# Patient Record
Sex: MALE
[De-identification: names, ages, dates, MRNs, and addresses within clinical notes are randomized per-mention and may not be internally consistent; named-entity substitution may affect disease eponyms.]

---

## 2017-05-08 ENCOUNTER — APPOINTMENT (OUTPATIENT)
Dept: ORTHOPEDIC SURGERY | Facility: CLINIC | Age: 67
End: 2017-05-08

## 2017-05-08 VITALS
WEIGHT: 210 LBS | HEIGHT: 68 IN | HEART RATE: 72 BPM | SYSTOLIC BLOOD PRESSURE: 109 MMHG | BODY MASS INDEX: 31.83 KG/M2 | DIASTOLIC BLOOD PRESSURE: 80 MMHG

## 2017-05-08 DIAGNOSIS — M22.42 CHONDROMALACIA PATELLAE, LEFT KNEE: ICD-10-CM

## 2017-05-08 DIAGNOSIS — M22.41 CHONDROMALACIA PATELLAE, RIGHT KNEE: ICD-10-CM

## 2017-05-08 DIAGNOSIS — S83.001A UNSPECIFIED SUBLUXATION OF RIGHT PATELLA, INITIAL ENCOUNTER: ICD-10-CM

## 2017-05-08 RX ORDER — FOLIC ACID 1 MG/1
1 TABLET ORAL
Refills: 0 | Status: ACTIVE | COMMUNITY

## 2017-05-10 ENCOUNTER — APPOINTMENT (OUTPATIENT)
Dept: SURGERY | Facility: CLINIC | Age: 67
End: 2017-05-10

## 2017-05-10 ENCOUNTER — OUTPATIENT (OUTPATIENT)
Dept: OUTPATIENT SERVICES | Facility: HOSPITAL | Age: 67
LOS: 1 days | End: 2017-05-10
Payer: COMMERCIAL

## 2017-05-10 VITALS
HEIGHT: 68 IN | DIASTOLIC BLOOD PRESSURE: 83 MMHG | HEART RATE: 66 BPM | RESPIRATION RATE: 16 BRPM | SYSTOLIC BLOOD PRESSURE: 121 MMHG | TEMPERATURE: 98 F | OXYGEN SATURATION: 97 % | WEIGHT: 213.85 LBS

## 2017-05-10 DIAGNOSIS — Z98.89 OTHER SPECIFIED POSTPROCEDURAL STATES: Chronic | ICD-10-CM

## 2017-05-10 DIAGNOSIS — S83.231A COMPLEX TEAR OF MEDIAL MENISCUS, CURRENT INJURY, RIGHT KNEE, INITIAL ENCOUNTER: ICD-10-CM

## 2017-05-10 DIAGNOSIS — Z90.49 ACQUIRED ABSENCE OF OTHER SPECIFIED PARTS OF DIGESTIVE TRACT: Chronic | ICD-10-CM

## 2017-05-10 DIAGNOSIS — Z01.818 ENCOUNTER FOR OTHER PREPROCEDURAL EXAMINATION: ICD-10-CM

## 2017-05-10 DIAGNOSIS — Z85.46 PERSONAL HISTORY OF MALIGNANT NEOPLASM OF PROSTATE: Chronic | ICD-10-CM

## 2017-05-10 LAB
ALBUMIN SERPL ELPH-MCNC: 4 G/DL — SIGNIFICANT CHANGE UP (ref 3.4–5)
ALP SERPL-CCNC: 67 U/L — SIGNIFICANT CHANGE UP (ref 40–120)
ALT FLD-CCNC: 43 U/L — HIGH (ref 12–42)
ANION GAP SERPL CALC-SCNC: 7 MMOL/L — LOW (ref 9–16)
APTT BLD: 36.5 SEC — SIGNIFICANT CHANGE UP (ref 27.5–37.4)
AST SERPL-CCNC: 26 U/L — SIGNIFICANT CHANGE UP (ref 15–37)
BILIRUB SERPL-MCNC: 1.1 MG/DL — SIGNIFICANT CHANGE UP (ref 0.2–1.2)
BUN SERPL-MCNC: 13 MG/DL — SIGNIFICANT CHANGE UP (ref 7–23)
CALCIUM SERPL-MCNC: 9.1 MG/DL — SIGNIFICANT CHANGE UP (ref 8.5–10.5)
CHLORIDE SERPL-SCNC: 104 MMOL/L — SIGNIFICANT CHANGE UP (ref 96–108)
CO2 SERPL-SCNC: 27 MMOL/L — SIGNIFICANT CHANGE UP (ref 22–31)
CREAT SERPL-MCNC: 0.89 MG/DL — SIGNIFICANT CHANGE UP (ref 0.5–1.3)
GLUCOSE SERPL-MCNC: 96 MG/DL — SIGNIFICANT CHANGE UP (ref 70–99)
HCT VFR BLD CALC: 44.8 % — SIGNIFICANT CHANGE UP (ref 39–50)
HGB BLD-MCNC: 15.3 G/DL — SIGNIFICANT CHANGE UP (ref 13–17)
INR BLD: 1 — SIGNIFICANT CHANGE UP (ref 0.88–1.16)
MCHC RBC-ENTMCNC: 32 PG — SIGNIFICANT CHANGE UP (ref 27–34)
MCHC RBC-ENTMCNC: 34.2 G/DL — SIGNIFICANT CHANGE UP (ref 32–36)
MCV RBC AUTO: 93.7 FL — SIGNIFICANT CHANGE UP (ref 80–100)
PLATELET # BLD AUTO: 217 K/UL — SIGNIFICANT CHANGE UP (ref 150–400)
POTASSIUM SERPL-MCNC: 4 MMOL/L — SIGNIFICANT CHANGE UP (ref 3.5–5.3)
POTASSIUM SERPL-SCNC: 4 MMOL/L — SIGNIFICANT CHANGE UP (ref 3.5–5.3)
PROT SERPL-MCNC: 7.6 G/DL — SIGNIFICANT CHANGE UP (ref 6.4–8.2)
PROTHROM AB SERPL-ACNC: 11.1 SEC — SIGNIFICANT CHANGE UP (ref 9.8–12.7)
RBC # BLD: 4.78 M/UL — SIGNIFICANT CHANGE UP (ref 4.2–5.8)
RBC # FLD: 13.5 % — SIGNIFICANT CHANGE UP (ref 10.3–16.9)
SODIUM SERPL-SCNC: 138 MMOL/L — SIGNIFICANT CHANGE UP (ref 135–145)
WBC # BLD: 5.8 K/UL — SIGNIFICANT CHANGE UP (ref 3.8–10.5)
WBC # FLD AUTO: 5.8 K/UL — SIGNIFICANT CHANGE UP (ref 3.8–10.5)

## 2017-05-10 PROCEDURE — 71020: CPT | Mod: 26

## 2017-05-10 PROCEDURE — 93010 ELECTROCARDIOGRAM REPORT: CPT

## 2017-05-10 NOTE — H&P PST ADULT - HISTORY OF PRESENT ILLNESS
Pt reports slipping and falling down subway stairs one month ago. He reports swelling and reduced ROM in right knee.

## 2017-05-10 NOTE — ASU PATIENT PROFILE, ADULT - PSH
H/O prostate cancer  2012 - seed implant  H/O vitrectomy  right 2015 for retinal detachment  History of appendectomy    S/P LASIK surgery  1999

## 2017-05-10 NOTE — H&P PST ADULT - FAMILY HISTORY
Father  Still living? No  Family history of pancreatic cancer, Age at diagnosis: 61-70     Mother  Still living? No  Family history of stomach cancer, Age at diagnosis: 41-50

## 2017-05-10 NOTE — H&P PST ADULT - NSANTHOSAYNRD_GEN_A_CORE
No. NURA screening performed.  STOP BANG Legend: 0-2 = LOW Risk; 3-4 = INTERMEDIATE Risk; 5-8 = HIGH Risk

## 2017-05-16 ENCOUNTER — RX RENEWAL (OUTPATIENT)
Age: 67
End: 2017-05-16

## 2017-05-23 ENCOUNTER — RESULT REVIEW (OUTPATIENT)
Age: 67
End: 2017-05-23

## 2017-05-23 ENCOUNTER — OUTPATIENT (OUTPATIENT)
Dept: OUTPATIENT SERVICES | Facility: HOSPITAL | Age: 67
LOS: 1 days | Discharge: HOME | End: 2017-05-23
Payer: COMMERCIAL

## 2017-05-23 ENCOUNTER — APPOINTMENT (OUTPATIENT)
Dept: ORTHOPEDIC SURGERY | Facility: HOSPITAL | Age: 67
End: 2017-05-23

## 2017-05-23 VITALS
HEART RATE: 61 BPM | HEIGHT: 68 IN | SYSTOLIC BLOOD PRESSURE: 126 MMHG | OXYGEN SATURATION: 96 % | WEIGHT: 213.19 LBS | DIASTOLIC BLOOD PRESSURE: 83 MMHG | TEMPERATURE: 98 F | RESPIRATION RATE: 16 BRPM

## 2017-05-23 VITALS
OXYGEN SATURATION: 99 % | HEART RATE: 70 BPM | TEMPERATURE: 97 F | SYSTOLIC BLOOD PRESSURE: 110 MMHG | RESPIRATION RATE: 13 BRPM | DIASTOLIC BLOOD PRESSURE: 65 MMHG

## 2017-05-23 DIAGNOSIS — Z98.89 OTHER SPECIFIED POSTPROCEDURAL STATES: Chronic | ICD-10-CM

## 2017-05-23 DIAGNOSIS — Z85.46 PERSONAL HISTORY OF MALIGNANT NEOPLASM OF PROSTATE: Chronic | ICD-10-CM

## 2017-05-23 DIAGNOSIS — Z90.49 ACQUIRED ABSENCE OF OTHER SPECIFIED PARTS OF DIGESTIVE TRACT: Chronic | ICD-10-CM

## 2017-05-23 PROCEDURE — 29881 ARTHRS KNE SRG MNISECTMY M/L: CPT | Mod: RT

## 2017-05-23 NOTE — ASU DISCHARGE PLAN (ADULT/PEDIATRIC). - NOTIFY
Bleeding that does not stop/Swelling that continues/Numbness, color, or temperature change to extremity/Persistent Nausea and Vomiting/Unable to Urinate/Pain not relieved by Medications/Fever greater than 101/Numbness, tingling/Excessive Diarrhea/Inability to Tolerate Liquids or Foods/Increased Irritability or Sluggishness

## 2017-05-23 NOTE — ASU DISCHARGE PLAN (ADULT/PEDIATRIC). - MEDICATION SUMMARY - MEDICATIONS TO TAKE
I will START or STAY ON the medications listed below when I get home from the hospital:    Rapaflo 8 mg oral capsule  -- 1 cap(s) by mouth once a day in pm  -- Indication: For bph    Livalo 2 mg oral tablet  -- 1 tab(s) by mouth once a day  -- Indication: For cholesterol    folic acid 1 mg oral tablet  -- 1 tab(s) by mouth once a day  -- Indication: For vitamins

## 2017-05-27 DIAGNOSIS — M17.11 UNILATERAL PRIMARY OSTEOARTHRITIS, RIGHT KNEE: ICD-10-CM

## 2017-05-27 DIAGNOSIS — I49.9 CARDIAC ARRHYTHMIA, UNSPECIFIED: ICD-10-CM
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
## 2017-05-27 DIAGNOSIS — E78.5 HYPERLIPIDEMIA, UNSPECIFIED: ICD-10-CM
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
## 2017-05-27 DIAGNOSIS — S83.251A BUCKET-HANDLE TEAR OF LATERAL MENISCUS, CURRENT INJURY, RIGHT KNEE, INITIAL ENCOUNTER: ICD-10-CM

## 2017-05-27 DIAGNOSIS — Z85.46 PERSONAL HISTORY OF MALIGNANT NEOPLASM OF PROSTATE: ICD-10-CM

## 2017-05-30 LAB — SURGICAL PATHOLOGY STUDY: SIGNIFICANT CHANGE UP

## 2017-05-31 ENCOUNTER — APPOINTMENT (OUTPATIENT)
Dept: ORTHOPEDIC SURGERY | Facility: CLINIC | Age: 67
End: 2017-05-31

## 2017-05-31 DIAGNOSIS — Z80.0 FAMILY HISTORY OF MALIGNANT NEOPLASM OF DIGESTIVE ORGANS: ICD-10-CM

## 2017-05-31 DIAGNOSIS — Z09 ENCOUNTER FOR FOLLOW-UP EXAMINATION AFTER COMPLETED TREATMENT FOR CONDITIONS OTHER THAN MALIGNANT NEOPLASM: ICD-10-CM

## 2017-05-31 DIAGNOSIS — Z85.9 PERSONAL HISTORY OF MALIGNANT NEOPLASM, UNSPECIFIED: ICD-10-CM

## 2017-05-31 DIAGNOSIS — Z80.9 FAMILY HISTORY OF MALIGNANT NEOPLASM, UNSPECIFIED: ICD-10-CM

## 2017-05-31 DIAGNOSIS — E78.00 PURE HYPERCHOLESTEROLEMIA, UNSPECIFIED: ICD-10-CM

## 2017-05-31 RX ORDER — DICLOFENAC SODIUM 75 MG/1
75 TABLET, DELAYED RELEASE ORAL
Qty: 60 | Refills: 1 | Status: ACTIVE | COMMUNITY
Start: 2017-05-31 | End: 1900-01-01

## 2017-06-07 ENCOUNTER — APPOINTMENT (OUTPATIENT)
Dept: ORTHOPEDIC SURGERY | Facility: CLINIC | Age: 67
End: 2017-06-07

## 2017-06-07 RX ORDER — CELECOXIB 200 MG/1
200 CAPSULE ORAL
Qty: 2 | Refills: 0 | Status: DISCONTINUED | COMMUNITY
Start: 2017-05-08 | End: 2017-06-07

## 2017-06-07 RX ORDER — TRAMADOL HYDROCHLORIDE 50 MG/1
50 TABLET, COATED ORAL
Qty: 40 | Refills: 0 | Status: ACTIVE | COMMUNITY
Start: 2017-06-07 | End: 1900-01-01

## 2017-06-07 RX ORDER — OXYCODONE AND ACETAMINOPHEN 5; 325 MG/1; MG/1
5-325 TABLET ORAL
Qty: 30 | Refills: 0 | Status: DISCONTINUED | COMMUNITY
Start: 2017-05-16 | End: 2017-06-07

## 2017-07-14 ENCOUNTER — APPOINTMENT (OUTPATIENT)
Dept: ORTHOPEDIC SURGERY | Facility: CLINIC | Age: 67
End: 2017-07-14

## 2017-07-14 DIAGNOSIS — Z98.890 OTHER SPECIFIED POSTPROCEDURAL STATES: ICD-10-CM

## 2017-07-14 DIAGNOSIS — S83.281A OTHER TEAR OF LATERAL MENISCUS, CURRENT INJURY, RIGHT KNEE, INITIAL ENCOUNTER: ICD-10-CM

## 2017-07-26 ENCOUNTER — APPOINTMENT (OUTPATIENT)
Dept: ORTHOPEDIC SURGERY | Facility: CLINIC | Age: 67
End: 2017-07-26
Payer: COMMERCIAL

## 2017-07-26 DIAGNOSIS — M25.461 EFFUSION, RIGHT KNEE: ICD-10-CM

## 2017-07-26 PROCEDURE — 20610 DRAIN/INJ JOINT/BURSA W/O US: CPT | Mod: 58,RT

## 2017-07-26 PROCEDURE — 99214 OFFICE O/P EST MOD 30 MIN: CPT | Mod: 25

## 2017-07-26 RX ORDER — AMOXICILLIN 500 MG/1
500 TABLET, FILM COATED ORAL
Qty: 20 | Refills: 0 | Status: ACTIVE | COMMUNITY
Start: 2017-05-15

## 2017-07-26 RX ORDER — SILODOSIN 4 MG/1
4 CAPSULE ORAL
Qty: 90 | Refills: 0 | Status: ACTIVE | COMMUNITY
Start: 2017-03-20

## 2017-09-06 RX ORDER — DICLOFENAC SODIUM 75 MG/1
75 TABLET, DELAYED RELEASE ORAL
Qty: 60 | Refills: 1 | Status: ACTIVE | COMMUNITY
Start: 2017-09-06 | End: 1900-01-01

## 2017-09-13 ENCOUNTER — APPOINTMENT (OUTPATIENT)
Dept: ORTHOPEDIC SURGERY | Facility: CLINIC | Age: 67
End: 2017-09-13
Payer: COMMERCIAL

## 2017-09-13 PROCEDURE — 20610 DRAIN/INJ JOINT/BURSA W/O US: CPT | Mod: RT

## 2017-09-20 ENCOUNTER — APPOINTMENT (OUTPATIENT)
Dept: ORTHOPEDIC SURGERY | Facility: CLINIC | Age: 67
End: 2017-09-20
Payer: COMMERCIAL

## 2017-09-20 PROCEDURE — 20610 DRAIN/INJ JOINT/BURSA W/O US: CPT | Mod: RT

## 2017-09-27 ENCOUNTER — APPOINTMENT (OUTPATIENT)
Dept: ORTHOPEDIC SURGERY | Facility: CLINIC | Age: 67
End: 2017-09-27
Payer: COMMERCIAL

## 2017-09-27 PROCEDURE — 20610 DRAIN/INJ JOINT/BURSA W/O US: CPT | Mod: RT

## 2017-10-03 ENCOUNTER — TRANSCRIPTION ENCOUNTER (OUTPATIENT)
Age: 67
End: 2017-10-03

## 2017-11-06 RX ORDER — DICLOFENAC SODIUM 75 MG/1
75 TABLET, DELAYED RELEASE ORAL
Qty: 60 | Refills: 1 | Status: ACTIVE | COMMUNITY
Start: 2017-11-06 | End: 1900-01-01

## 2017-11-14 ENCOUNTER — APPOINTMENT (OUTPATIENT)
Dept: ORTHOPEDIC SURGERY | Facility: CLINIC | Age: 67
End: 2017-11-14

## 2017-12-11 ENCOUNTER — RX RENEWAL (OUTPATIENT)
Age: 67
End: 2017-12-11

## 2017-12-11 RX ORDER — DICLOFENAC SODIUM 50 MG/1
50 TABLET, DELAYED RELEASE ORAL
Qty: 60 | Refills: 0 | Status: ACTIVE | COMMUNITY
Start: 2017-07-14 | End: 1900-01-01

## 2018-02-05 ENCOUNTER — APPOINTMENT (OUTPATIENT)
Dept: ORTHOPEDIC SURGERY | Facility: CLINIC | Age: 68
End: 2018-02-05
Payer: COMMERCIAL

## 2018-02-05 VITALS
BODY MASS INDEX: 27.43 KG/M2 | HEIGHT: 68 IN | HEART RATE: 65 BPM | WEIGHT: 181 LBS | DIASTOLIC BLOOD PRESSURE: 75 MMHG | SYSTOLIC BLOOD PRESSURE: 118 MMHG

## 2018-02-05 PROCEDURE — 73560 X-RAY EXAM OF KNEE 1 OR 2: CPT | Mod: LT

## 2018-02-05 PROCEDURE — 73564 X-RAY EXAM KNEE 4 OR MORE: CPT | Mod: RT

## 2018-02-05 PROCEDURE — 20610 DRAIN/INJ JOINT/BURSA W/O US: CPT | Mod: RT

## 2018-02-05 PROCEDURE — 99214 OFFICE O/P EST MOD 30 MIN: CPT | Mod: 25

## 2018-02-05 RX ORDER — PROMETHAZINE HYDROCHLORIDE 6.25 MG/5ML
6.25 SOLUTION ORAL
Qty: 400 | Refills: 0 | Status: ACTIVE | COMMUNITY
Start: 2018-01-06

## 2018-02-08 NOTE — ASU DISCHARGE PLAN (ADULT/PEDIATRIC). - RETURN TO WORK
[facility]  Pulmonary, Critical Care, and Sleep Medicine    Name: Trupti Conteh MRN: [id number]   : 1929 Hospital: [facility]   Date: 2018          IMPRESSION:   · Encephalopathy   · Severe Sepsis. · Pancytopenia   · Acute Bacteremia, + blood Cx noted. · Acute on Chronic Renal Insufficiency   · Parathyroid mass. S/p resection per Dr. Marin Raymond. · Hypoglycemia   · A fib  · CHF   · Coagulopathy   · Poor oral dentition. · Hx of prostate Ca. · Psoriasis      RECOMMENDATIONS:   · MS improved, still confused  · IV abx. · Wean pressors   · Glycemic control   · Renal fx same  · PO intake as able  · Partial code? ?  · Palliative care following     Subjective/History: Today weak and debilitated, still confused      Current Facility-Administered Medications   Medication Dose Route Frequency    [START ON 2018] vancomycin (VANCOCIN) 1250 mg in  ml infusion  1,250 mg IntraVENous Q24H    polyethylene glycol (MIRALAX) packet 17 g  17 g Oral DAILY    PHENYLephrine (PF)(AUTUMN-SYNEPHRINE) 30 mg in 0.9% sodium chloride 250 mL infusion   mcg/min IntraVENous TITRATE    insulin lispro (HUMALOG) injection   SubCUTAneous AC&HS    cyanocobalamin (VITAMIN B12) injection 1,000 mcg  1,000 mcg IntraMUSCular DAILY    dextrose 5% lactated ringers infusion  50 mL/hr IntraVENous CONTINUOUS    enoxaparin (LOVENOX) injection 80 mg  80 mg SubCUTAneous Q24H    amiodarone (CORDARONE) tablet 100 mg  100 mg Oral DAILY    melatonin tablet 9 mg  9 mg Oral QHS    nystatin (MYCOSTATIN) 100,000 unit/gram powder   Topical TID    sodium chloride (NS) flush 5-10 mL  5-10 mL IntraVENous Q8H          Review of Systems:  Review of systems not obtained due to patient factors.     Objective:   Vital Signs:    Visit Vitals    BP 98/75 (BP 1 Location: Left arm, BP Patient Position: At rest)    Pulse 80    Temp 97.5 °F (36.4 °C)    Resp 19    Ht 5' 9\" (1.753 m)    Wt 88 kg (194 lb 0.1 oz)    SpO2 98%    BMI 28.65 kg/m2       O2 Device: Nasal cannula   O2 Flow Rate (L/min): 4 l/min   Temp (24hrs), Av.8 °F (36 °C), Min:95.9 °F (35.5 °C), Max:97.5 °F (36.4 °C)       Intake/Output:   Last shift:         Last 3 shifts: 1901 -  07  In: 4875.6 [P.O.:840; I.V.:4035.6]  Out: 1225 [Urine:1225]    Intake/Output Summary (Last 24 hours) at 18 07  Last data filed at 18 0600   Gross per 24 hour   Intake          3087.15 ml   Output              640 ml   Net          2447.15 ml   Physical Exam:    General:  awake, weak, debilitated   Head:  Normocephalic, without obvious abnormality, atraumatic. Eyes:  Right eye patched   Nose: Nares normal. Septum midline. No drainage    Throat: Lips, mucosa, and tongue dry. POOR Teeth    Neck: Supple, symmetrical, trachea midline,  no JVD. Back:      Lungs: No wheeze. Chest wall:  No deformity. Heart:  Regular rate and rhythm. Abdomen:   Non distended. Extremities: Extremities no cyanosis or edema. Pulses: present   Skin: Skin color, texture, turgor normal. Has areas of bruising, has psoriasis changes. Bandage to his LUE. Lymph nodes: Cervical, supraclavicular, and axillary nodes normal.   Neurologic: Grossly nonfocal, moving all extremities.          Data:     Recent Results (from the past 24 hour(s))   GLUCOSE, POC    Collection Time: 18 11:42 AM   Result Value Ref Range    Glucose (POC) 135 (H) 65 - 100 mg/dL    Performed by Hussein Mcadams, POC    Collection Time: 18  5:21 PM   Result Value Ref Range    Glucose (POC) 100 65 - 100 mg/dL    Performed by Hussein Mcadams, POC    Collection Time: 18  9:17 PM   Result Value Ref Range    Glucose (POC) 104 (H) 65 - 100 mg/dL    Performed by [place], TROUGH    Collection Time: 18  9:49 PM   Result Value Ref Range    Vancomycin,trough 25.0 (HH) 5.0 - 10.0 ug/mL    Reported dose date: NOT PROVIDED      Reported dose time: NOT PROVIDED      Reported dose: NOT PROVIDED UNITS   METABOLIC PANEL, BASIC    Collection Time: 02/08/18  4:06 AM   Result Value Ref Range    Sodium 139 136 - 145 mmol/L    Potassium 4.1 3.5 - 5.1 mmol/L    Chloride 110 (H) 97 - 108 mmol/L    CO2 23 21 - 32 mmol/L    Anion gap 6 5 - 15 mmol/L    Glucose 107 (H) 65 - 100 mg/dL    BUN 28 (H) 6 - 20 MG/DL    Creatinine 1.62 (H) 0.70 - 1.30 MG/DL    BUN/Creatinine ratio 17 12 - 20      GFR est AA 49 (L) >60 ml/min/1.73m2    GFR est non-AA 40 (L) >60 ml/min/1.73m2    Calcium 8.5 8.5 - 10.1 MG/DL   CBC W/O DIFF    Collection Time: 02/08/18  4:06 AM   Result Value Ref Range    WBC 3.4 (L) 4.1 - 11.1 K/uL    RBC 2.87 (L) 4.10 - 5.70 M/uL    HGB 9.9 (L) 12.1 - 17.0 g/dL    HCT 30.7 (L) 36.6 - 50.3 %    .0 (H) 80.0 - 99.0 FL    MCH 34.5 (H) 26.0 - 34.0 PG    MCHC 32.2 30.0 - 36.5 g/dL    RDW 16.4 (H) 11.5 - 14.5 %    PLATELET 59 (L) 210 - 400 K/uL    MPV 12.5 8.9 - 12.9 FL    NRBC 0.0 0  WBC    ABSOLUTE NRBC 0.00 0.00 - 0.01 K/uL             Telemetry:AFIB    Imaging:  I have personally reviewed the patients radiographs and have reviewed the reports:             Total critical care time exclusive of procedures:  minutes  Soham Enrique MD No

## 2018-02-26 ENCOUNTER — APPOINTMENT (OUTPATIENT)
Dept: ORTHOPEDIC SURGERY | Facility: CLINIC | Age: 68
End: 2018-02-26
Payer: COMMERCIAL

## 2018-02-26 VITALS — WEIGHT: 181 LBS | BODY MASS INDEX: 27.43 KG/M2 | HEIGHT: 68 IN

## 2018-02-26 PROCEDURE — 20610 DRAIN/INJ JOINT/BURSA W/O US: CPT | Mod: RT

## 2018-03-05 ENCOUNTER — APPOINTMENT (OUTPATIENT)
Dept: ORTHOPEDIC SURGERY | Facility: CLINIC | Age: 68
End: 2018-03-05
Payer: COMMERCIAL

## 2018-03-05 PROCEDURE — 20610 DRAIN/INJ JOINT/BURSA W/O US: CPT | Mod: RT

## 2018-03-12 ENCOUNTER — APPOINTMENT (OUTPATIENT)
Dept: ORTHOPEDIC SURGERY | Facility: CLINIC | Age: 68
End: 2018-03-12
Payer: COMMERCIAL

## 2018-03-12 VITALS — HEIGHT: 68 IN | BODY MASS INDEX: 27.43 KG/M2 | WEIGHT: 181 LBS

## 2018-03-12 PROCEDURE — 20610 DRAIN/INJ JOINT/BURSA W/O US: CPT | Mod: RT

## 2018-04-02 ENCOUNTER — RX RENEWAL (OUTPATIENT)
Age: 68
End: 2018-04-02

## 2018-08-27 ENCOUNTER — RX RENEWAL (OUTPATIENT)
Age: 68
End: 2018-08-27

## 2018-08-27 RX ORDER — DICLOFENAC SODIUM 75 MG/1
75 TABLET, DELAYED RELEASE ORAL
Qty: 180 | Refills: 0 | Status: ACTIVE | COMMUNITY
Start: 2018-02-05 | End: 1900-01-01

## 2018-10-08 ENCOUNTER — APPOINTMENT (OUTPATIENT)
Dept: ORTHOPEDIC SURGERY | Facility: CLINIC | Age: 68
End: 2018-10-08
Payer: COMMERCIAL

## 2018-10-08 VITALS
BODY MASS INDEX: 27.43 KG/M2 | WEIGHT: 181 LBS | HEART RATE: 63 BPM | SYSTOLIC BLOOD PRESSURE: 111 MMHG | HEIGHT: 68 IN | DIASTOLIC BLOOD PRESSURE: 74 MMHG

## 2018-10-08 DIAGNOSIS — M25.561 PAIN IN RIGHT KNEE: ICD-10-CM

## 2018-10-08 PROCEDURE — 99213 OFFICE O/P EST LOW 20 MIN: CPT

## 2018-10-08 PROCEDURE — 73564 X-RAY EXAM KNEE 4 OR MORE: CPT | Mod: RT

## 2018-10-08 PROCEDURE — 73560 X-RAY EXAM OF KNEE 1 OR 2: CPT | Mod: LT

## 2018-12-18 ENCOUNTER — INPATIENT (INPATIENT)
Facility: HOSPITAL | Age: 68
LOS: 1 days | Discharge: HOME | DRG: 310 | End: 2018-12-20
Attending: STUDENT IN AN ORGANIZED HEALTH CARE EDUCATION/TRAINING PROGRAM | Admitting: STUDENT IN AN ORGANIZED HEALTH CARE EDUCATION/TRAINING PROGRAM
Payer: COMMERCIAL

## 2018-12-18 VITALS
WEIGHT: 177.91 LBS | TEMPERATURE: 98 F | OXYGEN SATURATION: 98 % | HEIGHT: 68 IN | DIASTOLIC BLOOD PRESSURE: 77 MMHG | SYSTOLIC BLOOD PRESSURE: 125 MMHG | HEART RATE: 107 BPM | RESPIRATION RATE: 16 BRPM

## 2018-12-18 DIAGNOSIS — Z98.89 OTHER SPECIFIED POSTPROCEDURAL STATES: Chronic | ICD-10-CM

## 2018-12-18 DIAGNOSIS — Z90.49 ACQUIRED ABSENCE OF OTHER SPECIFIED PARTS OF DIGESTIVE TRACT: Chronic | ICD-10-CM

## 2018-12-18 DIAGNOSIS — Z85.46 PERSONAL HISTORY OF MALIGNANT NEOPLASM OF PROSTATE: Chronic | ICD-10-CM

## 2018-12-18 RX ORDER — SODIUM CHLORIDE 9 MG/ML
3 INJECTION INTRAMUSCULAR; INTRAVENOUS; SUBCUTANEOUS ONCE
Refills: 0 | Status: COMPLETED | OUTPATIENT
Start: 2018-12-18 | End: 2018-12-18

## 2018-12-19 DIAGNOSIS — Z29.9 ENCOUNTER FOR PROPHYLACTIC MEASURES, UNSPECIFIED: ICD-10-CM

## 2018-12-19 DIAGNOSIS — I48.91 UNSPECIFIED ATRIAL FIBRILLATION: ICD-10-CM

## 2018-12-19 LAB
ALBUMIN SERPL ELPH-MCNC: 3.7 G/DL — SIGNIFICANT CHANGE UP (ref 3.5–5)
ALP SERPL-CCNC: 57 U/L — SIGNIFICANT CHANGE UP (ref 40–120)
ALT FLD-CCNC: 24 U/L DA — SIGNIFICANT CHANGE UP (ref 10–60)
ANION GAP SERPL CALC-SCNC: 6 MMOL/L — SIGNIFICANT CHANGE UP (ref 5–17)
ANION GAP SERPL CALC-SCNC: 9 MMOL/L — SIGNIFICANT CHANGE UP (ref 5–17)
APTT BLD: 32.7 SEC — SIGNIFICANT CHANGE UP (ref 27.5–36.3)
AST SERPL-CCNC: 22 U/L — SIGNIFICANT CHANGE UP (ref 10–40)
BASOPHILS # BLD AUTO: 0 K/UL — SIGNIFICANT CHANGE UP (ref 0–0.2)
BASOPHILS # BLD AUTO: 0.1 K/UL — SIGNIFICANT CHANGE UP (ref 0–0.2)
BASOPHILS NFR BLD AUTO: 0.8 % — SIGNIFICANT CHANGE UP (ref 0–2)
BASOPHILS NFR BLD AUTO: 0.9 % — SIGNIFICANT CHANGE UP (ref 0–2)
BILIRUB SERPL-MCNC: 0.4 MG/DL — SIGNIFICANT CHANGE UP (ref 0.2–1.2)
BUN SERPL-MCNC: 12 MG/DL — SIGNIFICANT CHANGE UP (ref 7–18)
BUN SERPL-MCNC: 14 MG/DL — SIGNIFICANT CHANGE UP (ref 7–18)
CALCIUM SERPL-MCNC: 8.8 MG/DL — SIGNIFICANT CHANGE UP (ref 8.4–10.5)
CALCIUM SERPL-MCNC: 8.9 MG/DL — SIGNIFICANT CHANGE UP (ref 8.4–10.5)
CHLORIDE SERPL-SCNC: 107 MMOL/L — SIGNIFICANT CHANGE UP (ref 96–108)
CHLORIDE SERPL-SCNC: 108 MMOL/L — SIGNIFICANT CHANGE UP (ref 96–108)
CHOLEST SERPL-MCNC: 173 MG/DL — SIGNIFICANT CHANGE UP (ref 10–199)
CK MB BLD-MCNC: <1.2 % — SIGNIFICANT CHANGE UP (ref 0–3.5)
CK MB BLD-MCNC: <1.5 % — SIGNIFICANT CHANGE UP (ref 0–3.5)
CK MB CFR SERPL CALC: <1 NG/ML — SIGNIFICANT CHANGE UP (ref 0–3.6)
CK MB CFR SERPL CALC: <1 NG/ML — SIGNIFICANT CHANGE UP (ref 0–3.6)
CK SERPL-CCNC: 68 U/L — SIGNIFICANT CHANGE UP (ref 35–232)
CK SERPL-CCNC: 86 U/L — SIGNIFICANT CHANGE UP (ref 35–232)
CO2 SERPL-SCNC: 26 MMOL/L — SIGNIFICANT CHANGE UP (ref 22–31)
CO2 SERPL-SCNC: 29 MMOL/L — SIGNIFICANT CHANGE UP (ref 22–31)
CREAT SERPL-MCNC: 1.01 MG/DL — SIGNIFICANT CHANGE UP (ref 0.5–1.3)
CREAT SERPL-MCNC: 1.07 MG/DL — SIGNIFICANT CHANGE UP (ref 0.5–1.3)
EOSINOPHIL # BLD AUTO: 0.1 K/UL — SIGNIFICANT CHANGE UP (ref 0–0.5)
EOSINOPHIL # BLD AUTO: 0.1 K/UL — SIGNIFICANT CHANGE UP (ref 0–0.5)
EOSINOPHIL NFR BLD AUTO: 0.7 % — SIGNIFICANT CHANGE UP (ref 0–6)
EOSINOPHIL NFR BLD AUTO: 0.9 % — SIGNIFICANT CHANGE UP (ref 0–6)
FOLATE SERPL-MCNC: >20 NG/ML — SIGNIFICANT CHANGE UP
GLUCOSE SERPL-MCNC: 104 MG/DL — HIGH (ref 70–99)
GLUCOSE SERPL-MCNC: 108 MG/DL — HIGH (ref 70–99)
HBA1C BLD-MCNC: 5.7 % — HIGH (ref 4–5.6)
HCT VFR BLD CALC: 45.5 % — SIGNIFICANT CHANGE UP (ref 39–50)
HCT VFR BLD CALC: 47.1 % — SIGNIFICANT CHANGE UP (ref 39–50)
HDLC SERPL-MCNC: 75 MG/DL — SIGNIFICANT CHANGE UP
HGB BLD-MCNC: 14.9 G/DL — SIGNIFICANT CHANGE UP (ref 13–17)
HGB BLD-MCNC: 15.2 G/DL — SIGNIFICANT CHANGE UP (ref 13–17)
INR BLD: 1.01 RATIO — SIGNIFICANT CHANGE UP (ref 0.88–1.16)
LIPID PNL WITH DIRECT LDL SERPL: 86 MG/DL — SIGNIFICANT CHANGE UP
LYMPHOCYTES # BLD AUTO: 1.7 K/UL — SIGNIFICANT CHANGE UP (ref 1–3.3)
LYMPHOCYTES # BLD AUTO: 2.1 K/UL — SIGNIFICANT CHANGE UP (ref 1–3.3)
LYMPHOCYTES # BLD AUTO: 22.3 % — SIGNIFICANT CHANGE UP (ref 13–44)
LYMPHOCYTES # BLD AUTO: 32.1 % — SIGNIFICANT CHANGE UP (ref 13–44)
MCHC RBC-ENTMCNC: 31.6 PG — SIGNIFICANT CHANGE UP (ref 27–34)
MCHC RBC-ENTMCNC: 31.8 PG — SIGNIFICANT CHANGE UP (ref 27–34)
MCHC RBC-ENTMCNC: 32.2 GM/DL — SIGNIFICANT CHANGE UP (ref 32–36)
MCHC RBC-ENTMCNC: 32.8 GM/DL — SIGNIFICANT CHANGE UP (ref 32–36)
MCV RBC AUTO: 96.4 FL — SIGNIFICANT CHANGE UP (ref 80–100)
MCV RBC AUTO: 98.7 FL — SIGNIFICANT CHANGE UP (ref 80–100)
MONOCYTES # BLD AUTO: 0.4 K/UL — SIGNIFICANT CHANGE UP (ref 0–0.9)
MONOCYTES # BLD AUTO: 0.6 K/UL — SIGNIFICANT CHANGE UP (ref 0–0.9)
MONOCYTES NFR BLD AUTO: 6.5 % — SIGNIFICANT CHANGE UP (ref 2–14)
MONOCYTES NFR BLD AUTO: 7.6 % — SIGNIFICANT CHANGE UP (ref 2–14)
NEUTROPHILS # BLD AUTO: 3.8 K/UL — SIGNIFICANT CHANGE UP (ref 1.8–7.4)
NEUTROPHILS # BLD AUTO: 5.4 K/UL — SIGNIFICANT CHANGE UP (ref 1.8–7.4)
NEUTROPHILS NFR BLD AUTO: 59.7 % — SIGNIFICANT CHANGE UP (ref 43–77)
NEUTROPHILS NFR BLD AUTO: 68.5 % — SIGNIFICANT CHANGE UP (ref 43–77)
PLATELET # BLD AUTO: 174 K/UL — SIGNIFICANT CHANGE UP (ref 150–400)
PLATELET # BLD AUTO: 183 K/UL — SIGNIFICANT CHANGE UP (ref 150–400)
POTASSIUM SERPL-MCNC: 4 MMOL/L — SIGNIFICANT CHANGE UP (ref 3.5–5.3)
POTASSIUM SERPL-MCNC: 4.2 MMOL/L — SIGNIFICANT CHANGE UP (ref 3.5–5.3)
POTASSIUM SERPL-SCNC: 4 MMOL/L — SIGNIFICANT CHANGE UP (ref 3.5–5.3)
POTASSIUM SERPL-SCNC: 4.2 MMOL/L — SIGNIFICANT CHANGE UP (ref 3.5–5.3)
PROT SERPL-MCNC: 7.1 G/DL — SIGNIFICANT CHANGE UP (ref 6–8.3)
PROTHROM AB SERPL-ACNC: 11.2 SEC — SIGNIFICANT CHANGE UP (ref 10–12.9)
RBC # BLD: 4.72 M/UL — SIGNIFICANT CHANGE UP (ref 4.2–5.8)
RBC # BLD: 4.77 M/UL — SIGNIFICANT CHANGE UP (ref 4.2–5.8)
RBC # FLD: 12.3 % — SIGNIFICANT CHANGE UP (ref 10.3–14.5)
RBC # FLD: 12.4 % — SIGNIFICANT CHANGE UP (ref 10.3–14.5)
SODIUM SERPL-SCNC: 142 MMOL/L — SIGNIFICANT CHANGE UP (ref 135–145)
SODIUM SERPL-SCNC: 143 MMOL/L — SIGNIFICANT CHANGE UP (ref 135–145)
T4 FREE+ TSH PNL SERPL: 1.4 UU/ML — SIGNIFICANT CHANGE UP (ref 0.34–4.82)
TOTAL CHOLESTEROL/HDL RATIO MEASUREMENT: 2.3 RATIO — LOW (ref 3.4–9.6)
TRIGL SERPL-MCNC: 62 MG/DL — SIGNIFICANT CHANGE UP (ref 10–149)
TROPONIN I SERPL-MCNC: 0.05 NG/ML — HIGH (ref 0–0.04)
TSH SERPL-MCNC: 1.85 UU/ML — SIGNIFICANT CHANGE UP (ref 0.34–4.82)
VIT B12 SERPL-MCNC: 1340 PG/ML — HIGH (ref 232–1245)
WBC # BLD: 6.4 K/UL — SIGNIFICANT CHANGE UP (ref 3.8–10.5)
WBC # BLD: 7.8 K/UL — SIGNIFICANT CHANGE UP (ref 3.8–10.5)
WBC # FLD AUTO: 6.4 K/UL — SIGNIFICANT CHANGE UP (ref 3.8–10.5)
WBC # FLD AUTO: 7.8 K/UL — SIGNIFICANT CHANGE UP (ref 3.8–10.5)

## 2018-12-19 PROCEDURE — 99223 1ST HOSP IP/OBS HIGH 75: CPT

## 2018-12-19 PROCEDURE — 71045 X-RAY EXAM CHEST 1 VIEW: CPT | Mod: 26

## 2018-12-19 PROCEDURE — 99285 EMERGENCY DEPT VISIT HI MDM: CPT | Mod: 25

## 2018-12-19 RX ORDER — ASPIRIN/CALCIUM CARB/MAGNESIUM 324 MG
81 TABLET ORAL DAILY
Refills: 0 | Status: DISCONTINUED | OUTPATIENT
Start: 2018-12-19 | End: 2018-12-20

## 2018-12-19 RX ORDER — TAMSULOSIN HYDROCHLORIDE 0.4 MG/1
0.4 CAPSULE ORAL AT BEDTIME
Refills: 0 | Status: DISCONTINUED | OUTPATIENT
Start: 2018-12-19 | End: 2018-12-20

## 2018-12-19 RX ORDER — ENOXAPARIN SODIUM 100 MG/ML
80 INJECTION SUBCUTANEOUS EVERY 12 HOURS
Refills: 0 | Status: DISCONTINUED | OUTPATIENT
Start: 2018-12-19 | End: 2018-12-20

## 2018-12-19 RX ORDER — ATORVASTATIN CALCIUM 80 MG/1
40 TABLET, FILM COATED ORAL AT BEDTIME
Refills: 0 | Status: DISCONTINUED | OUTPATIENT
Start: 2018-12-19 | End: 2018-12-20

## 2018-12-19 RX ORDER — METOPROLOL TARTRATE 50 MG
25 TABLET ORAL
Refills: 0 | Status: DISCONTINUED | OUTPATIENT
Start: 2018-12-19 | End: 2018-12-19

## 2018-12-19 RX ORDER — METOPROLOL TARTRATE 50 MG
25 TABLET ORAL
Refills: 0 | Status: DISCONTINUED | OUTPATIENT
Start: 2018-12-19 | End: 2018-12-20

## 2018-12-19 RX ORDER — ASPIRIN/CALCIUM CARB/MAGNESIUM 324 MG
162 TABLET ORAL ONCE
Refills: 0 | Status: COMPLETED | OUTPATIENT
Start: 2018-12-19 | End: 2018-12-19

## 2018-12-19 RX ORDER — ENOXAPARIN SODIUM 100 MG/ML
80 INJECTION SUBCUTANEOUS ONCE
Refills: 0 | Status: COMPLETED | OUTPATIENT
Start: 2018-12-19 | End: 2018-12-19

## 2018-12-19 RX ORDER — FOLIC ACID 0.8 MG
1 TABLET ORAL DAILY
Refills: 0 | Status: DISCONTINUED | OUTPATIENT
Start: 2018-12-19 | End: 2018-12-20

## 2018-12-19 RX ORDER — DILTIAZEM HCL 120 MG
20 CAPSULE, EXT RELEASE 24 HR ORAL ONCE
Refills: 0 | Status: COMPLETED | OUTPATIENT
Start: 2018-12-19 | End: 2018-12-19

## 2018-12-19 RX ORDER — LANOLIN ALCOHOL/MO/W.PET/CERES
3 CREAM (GRAM) TOPICAL ONCE
Refills: 0 | Status: COMPLETED | OUTPATIENT
Start: 2018-12-19 | End: 2018-12-19

## 2018-12-19 RX ORDER — DIPHENHYDRAMINE HCL 50 MG
25 CAPSULE ORAL ONCE
Refills: 0 | Status: COMPLETED | OUTPATIENT
Start: 2018-12-19 | End: 2018-12-19

## 2018-12-19 RX ADMIN — Medication 20 MILLIGRAM(S): at 01:15

## 2018-12-19 RX ADMIN — Medication 81 MILLIGRAM(S): at 11:11

## 2018-12-19 RX ADMIN — Medication 1 MILLIGRAM(S): at 11:11

## 2018-12-19 RX ADMIN — Medication 25 MILLIGRAM(S): at 05:24

## 2018-12-19 RX ADMIN — SODIUM CHLORIDE 3 MILLILITER(S): 9 INJECTION INTRAMUSCULAR; INTRAVENOUS; SUBCUTANEOUS at 00:29

## 2018-12-19 RX ADMIN — Medication 162 MILLIGRAM(S): at 02:58

## 2018-12-19 RX ADMIN — ATORVASTATIN CALCIUM 40 MILLIGRAM(S): 80 TABLET, FILM COATED ORAL at 23:41

## 2018-12-19 RX ADMIN — Medication 3 MILLIGRAM(S): at 02:58

## 2018-12-19 RX ADMIN — ENOXAPARIN SODIUM 80 MILLIGRAM(S): 100 INJECTION SUBCUTANEOUS at 01:15

## 2018-12-19 RX ADMIN — ENOXAPARIN SODIUM 80 MILLIGRAM(S): 100 INJECTION SUBCUTANEOUS at 11:11

## 2018-12-19 RX ADMIN — ENOXAPARIN SODIUM 80 MILLIGRAM(S): 100 INJECTION SUBCUTANEOUS at 23:41

## 2018-12-19 RX ADMIN — Medication 25 MILLIGRAM(S): at 02:58

## 2018-12-19 RX ADMIN — TAMSULOSIN HYDROCHLORIDE 0.4 MILLIGRAM(S): 0.4 CAPSULE ORAL at 23:40

## 2018-12-19 NOTE — ED PROVIDER NOTE - OBJECTIVE STATEMENT
67 y/o M patient with a significant PMHx of Afib and Prostate CA and no significant PSHx presents to the ED with heart palpitations and intermittent shortness of breath since 10am today. Patient is aware of his history of Afib and denies being Rx any medication medical history. Patient notes taking 81mg of Aspiring. Patient has an indwelling cardiac monitor. Patient denies chest pain and any other complaints. Patient denies smoking history, drug use, EtOH use. PMD Dr. Rona Patiño. KANDICE.

## 2018-12-19 NOTE — ED PROVIDER NOTE - MEDICAL DECISION MAKING DETAILS
MAR and Dr. garcia endorsed. Pt agrees with admission for cardiac monitoring. I had a detailed discussion with the patient and/or guardian regarding the historical points, exam findings, and any diagnostic results supporting the admit diagnosis.

## 2018-12-19 NOTE — H&P ADULT - PROBLEM SELECTOR PLAN 2
IMPROVE VTE Individual Risk Assessment    RISK                                                          Points  [] Previous VTE                                           3  [] Thrombophilia                                        2  [] Lower limb paralysis                              2   [] Current Cancer                                       2   [x] Immobilization > 24 hrs                        1  [] ICU/CCU stay > 24 hours                       1  [x] Age > 60                                                   1    IMPROVE VTE Score: 2  on lovenox for a fib  no need for GI ppx

## 2018-12-19 NOTE — H&P ADULT - ASSESSMENT
69 yo male PMHx of Paroxysmal  A fib (not on AC) and Prostate CA (s/p RT in 2012) presents to the ED with heart palpitations,  intermittent shortness of breath, diaphoresis since 10am yesterday after he came back from work. Pt endorsed he has some stress from his work and he is aware of his history of Afib and denies being Rx any medication medical history. Patient notes taking 81mg of Aspirin. Patient has an indwelling cardiac monitor since 2015, last interrogation was 2 months ago and was told result normal. His cardiologist is DR. Raheem Patiño. Patient denies fever, chills,  chest pain, abd pain, n/v/c/d or any other complaints.    In ED, pt vitals stable except , EKG noted a fib with RVR 140s, no st-t wave changes. Troponin mild elevated 0.046. s/p lovenox 80mg iv x1 and cardiem 20mg IV x1 in ED. 69 yo male PMHx of Paroxysmal  A fib (not on AC), TIA and Prostate CA (s/p RT in 2012) presents to the ED with heart palpitations,  intermittent shortness of breath, diaphoresis since 10am yesterday , EKG noted a fib with RVR 140s, no st-t wave changes. Troponin mild elevated 0.046. s/p lovenox 80mg iv x1 and cardiem 20mg IV x1 in ED.

## 2018-12-19 NOTE — H&P ADULT - ATTENDING COMMENTS
Patient seen and examined ; case was discussed with the admitting resident    ROS: as in the HPI; all other ROS negative    SH and family history as above    Vital Signs Last 24 Hrs  T(C): 36.7 (19 Dec 2018 05:45), Max: 36.7 (19 Dec 2018 01:16)  T(F): 98.1 (19 Dec 2018 05:45), Max: 98.1 (19 Dec 2018 01:16)  HR: 93 (19 Dec 2018 05:45) (89 - 120)  BP: 139/99 (19 Dec 2018 05:45) (104/74 - 139/99)  BP(mean): --  RR: 18 (19 Dec 2018 05:45) (16 - 18)  SpO2: 100% (19 Dec 2018 05:45) (98% - 100%)    GEN: NAD  HEENT- normocephalic; mouth moist  CVS- S1S2+  LUNGS- clear to auscultation; no wheezing  ABD: Soft , nontender, nondistended, Bowel sounds are present  EXTREMITY: no calf tenderness, no cyanosis, no edema  NEURO: AAOx3; non focal neurologic exam; cranial nerves grossly intact  PSYCH: normal affect and behavior  BACK: no swelling or mass;   VASCULAR: ++ distal peripheral pulses  SKIN: warm and dry.       Labs Reviewed:                         15.2   6.4   )-----------( 174      ( 19 Dec 2018 05:52 )             47.1     12-19    142  |  107  |  12  ----------------------------<  108<H>  4.2   |  29  |  1.01    Ca    8.9      19 Dec 2018 05:52    TPro  7.1  /  Alb  3.7  /  TBili  0.4  /  DBili  x   /  AST  22  /  ALT  24  /  AlkPhos  57  12-19    CARDIAC MARKERS ( 19 Dec 2018 05:52 )  0.047 ng/mL / x     / 86 U/L / x     / <1.0 ng/mL  CARDIAC MARKERS ( 19 Dec 2018 00:29 )  0.046 ng/mL / x     / x     / x     / x            PT/INR - ( 19 Dec 2018 00:29 )   PT: 11.2 sec;   INR: 1.01 ratio         PTT - ( 19 Dec 2018 00:29 )  PTT:32.7 sec  BNP:   MEDICATIONS  (STANDING):  aspirin  chewable 81 milliGRAM(s) Oral daily  atorvastatin 40 milliGRAM(s) Oral at bedtime  enoxaparin Injectable 80 milliGRAM(s) SubCutaneous every 12 hours  folic acid 1 milliGRAM(s) Oral daily  metoprolol tartrate 25 milliGRAM(s) Oral two times a day  tamsulosin 0.4 milliGRAM(s) Oral at bedtime    MEDICATIONS  (PRN):      CXR reviewed    EKG Reviewed: a fib      67 y/o M with p a fib not on tx admitted with a fib w RVR and chest pain with mild troponinemia.     1.A fib with RVR- hemodynamically stable, sensitive to diltiazem, with good rate control, continue metoprolol, tele, echo, request cardiology eval. Advised to stop taking diclofenac and discussed r/b/a to ac.  2.Elevated troponin- suspected 2/2 a fib with rvr, trend, acs eval  3.H/o prostate ca- recent PSA 0         Plan of care discussed with patient ;  all questions and concerns were addressed.

## 2018-12-19 NOTE — H&P ADULT - NSHPLABSRESULTS_GEN_ALL_CORE
Complete Blood Count + Automated Diff (12.19.18 @ 00:29)    WBC Count: 7.8 K/uL    RBC Count: 4.72 M/uL    Hemoglobin: 14.9 g/dL    Hematocrit: 45.5 %    Mean Cell Volume: 96.4 fl    Mean Cell Hemoglobin: 31.6 pg    Mean Cell Hemoglobin Conc: 32.8 gm/dL    Red Cell Distrib Width: 12.4 %    Platelet Count - Automated: 183 K/uL    Auto Neutrophil #: 5.4 K/uL    Auto Lymphocyte #: 1.7 K/uL    Auto Monocyte #: 0.6 K/uL    Auto Eosinophil #: 0.1 K/uL    Auto Basophil #: 0.1 K/uL    Auto Neutrophil %: 68.5: Differential percentages must be correlated with absolute numbers for  clinical significance. %    Auto Lymphocyte %: 22.3 %    Auto Monocyte %: 7.6 %    Auto Eosinophil %: 0.7 %    Auto Basophil %: 0.9 %      Comprehensive Metabolic Panel (12.19.18 @ 00:29)    Sodium, Serum: 143 mmol/L    Potassium, Serum: 4.0 mmol/L    Chloride, Serum: 108 mmol/L    Carbon Dioxide, Serum: 26 mmol/L    Anion Gap, Serum: 9 mmol/L    Blood Urea Nitrogen, Serum: 14 mg/dL    Creatinine, Serum: 1.07 mg/dL    Glucose, Serum: 104 mg/dL    Calcium, Total Serum: 8.8 mg/dL    Protein Total, Serum: 7.1 g/dL    Albumin, Serum: 3.7 g/dL    Bilirubin Total, Serum: 0.4 mg/dL    Alkaline Phosphatase, Serum: 57 U/L    Aspartate Aminotransferase (AST/SGOT): 22 U/L    Alanine Aminotransferase (ALT/SGPT): 24 U/L DA    eGFR if Non : 70: Interpretative comment  The units for eGFR are mL/min/1.73M2 (normalized body surface area). The  eGFR is calculated from a serum creatinine using the CKD-EPI equation.  Other variables required for calculation are race, age and sex. Among  patients with chronic kidney disease (CKD), the eGFR is useful in  determining the stage of disease according to KDOQI CKD classification.  All eGFR results are reported numerically with the following  interpretation.          GFR                    With                 Without     (ml/min/1.73 m2)    Kidney Damage       Kidney Damage        >= 90                    Stage 1                     Normal        60-89                    Stage 2                     Decreased GFR        30-59     Stage 3                     Stage 3        15-29                    Stage 4                     Stage 4        < 15                      Stage 5                     Stage 5  Each stage of CKD assumes that the associated GFR level has been in  effect for at least 3 months. Determination of stages one and two (with  eGFR > 59 ml/min/m2) requires estimation of kidney damage for at least 3  months as defined by structural or functional abnormalities.  Limitations: All estimates of GFR will be less accurate for patients at  extremes of muscle mass (including but not limited to frail elderly,  critically ill, or cancer patients), those with unusual diets, and those  with conditions associated with reduced secretion or extrarenal  elimination of creatinine. The eGFR equation is not recommended for use  in patients with unstable creatinine levels. mL/min/1.73M2    eGFR if African American: 82 mL/min/1.73M2

## 2018-12-19 NOTE — CONSULT NOTE ADULT - ATTENDING COMMENTS
Thank you for the courtesy of the consultation,I would be available for any further discussion if needed.  Scott Jean MD,FACC.  [address] [place], [place] [id number] [phone]

## 2018-12-19 NOTE — CONSULT NOTE ADULT - ASSESSMENT
· Assessment		  69 yo male PMHx of Paroxysmal  Atrial Fibrillation (not on AC), TIA and Prostate CA (s/p RT in 2012) presents to the ED with heart palpitations,  intermittent shortness of breath, diaphoresis since 10am yesterday , EKG noted a fib with RVR 140s, no st-t wave changes. Hx Loop recorder        Problem/Plan - 1:  ·  Problem: Atrial Fibrillation with RVR.  Plan: Hx of PAF, not on AC, p/w palpitation   CHADSVASc score 3, need AC for stroke prevention   EKG noted a fib with RVR 140s, no st-t wave changes  Continue AC  TTE-EF-45%  EP Consult Dr Ratliff called.  Rate control

## 2018-12-19 NOTE — H&P ADULT - NSHPPHYSICALEXAM_GEN_ALL_CORE
Vital Signs Last 24 Hrs  T(C): 36.7 (19 Dec 2018 01:16), Max: 36.7 (19 Dec 2018 01:16)  T(F): 98.1 (19 Dec 2018 01:16), Max: 98.1 (19 Dec 2018 01:16)  HR: 120 (19 Dec 2018 01:16) (107 - 120)  BP: 104/74 (19 Dec 2018 01:16) (104/74 - 125/77)  BP(mean): --  RR: 18 (19 Dec 2018 01:16) (16 - 18)  SpO2: 99% (19 Dec 2018 01:16) (98% - 99%)

## 2018-12-19 NOTE — H&P ADULT - PROBLEM SELECTOR PLAN 1
hx of PAF, not on AC, p/w palpitation   CHADSVASc score 3, need AC for stroke prevention   EKG noted a fib with RVR 140s, no st-t wave changes  Troponin mild elevated 0.046  s/p lovenox 80mg iv x1 and cardiem 20mg IV x1 in ED  f/u TTE and cardiac enzyme  on telemetry  on asa, lipitor and metoprolol   cardio consulted DR. Escobar

## 2018-12-19 NOTE — CONSULT NOTE ADULT - SUBJECTIVE AND OBJECTIVE BOX
CHIEF COMPLAINT:Palpitations    HPI:-67 yo male PMHx of Paroxysmal  Atrial Fibrillation (not on AC), TIA and Prostate CA (s/p RT in 2012) presents to the ED with heart palpitations,  intermittent shortness of breath, diaphoresis since 10am yesterday after he came back from work. Pt endorsed he has some stress from his work and he is aware of his history of Afib and denies being Rx any medication medical history. Patient notes taking 81mg of Aspirin. Patient has an indwelling cardiac monitor since 2015, last interrogation was 2 months ago and was told result normal. His cardiologist is DR. Raheem Patiño. Patient denies fever, chills,  chest pain, abd pain, n/v/c/d or any other complaints.    In ED, pt vitals stable except , EKG noted AF  with RVR 140s, no st-t wave changes. Troponin mild elevated 0.046. s/p lovenox 80mg iv x1 and cardiem 20mg IV x1 in ED.       PAST MEDICAL & SURGICAL HISTORY:  Afib  Prostate CA  Obesity  Dyslipidemia  Disease of biliary tract, unspecified  Malignant neoplasm: h/o  No significant past surgical history  S/P LASIK surgery: 1999  H/O prostate cancer: 2012 - seed implant  H/O vitrectomy: right 2015 for retinal detachment  History of appendectomy      MEDICATIONS  (STANDING):  aspirin  chewable 81 milliGRAM(s) Oral daily  atorvastatin 40 milliGRAM(s) Oral at bedtime  enoxaparin Injectable 80 milliGRAM(s) SubCutaneous every 12 hours  folic acid 1 milliGRAM(s) Oral daily  metoprolol tartrate 25 milliGRAM(s) Oral two times a day  tamsulosin 0.4 milliGRAM(s) Oral at bedtime    MEDICATIONS  (PRN):      FAMILY HISTORY:  No pertinent family history in first degree relatives  Family history of stomach cancer (Mother)  Family history of pancreatic cancer (Father)    No family history of premature coronary artery disease or sudden cardiac death    SOCIAL HISTORY:  Smoking-Former Smoker > 20 yrs  Alcohol-Denies  Ilicit Drug use-Denies    REVIEW OF SYSTEMS:  Constitutional: [ ] fever, [ ]weight loss, [ ]fatigue Activity [ ] Bedbound,[ ] Ambulates [ ] Unassisted[ ] Cane/Walker [ ] Assistence.  Eyes: [ ] visual changes  Respiratory: [ ]shortness of breath;  [ ] cough, [ ]wheezing, [ ]chills, [ ]hemoptysis  Cardiovascular: [ ] chest pain, [x ]palpitations, [ ]dizziness,  [ ]leg swelling[ ]orthopnea [ ]PND  Gastrointestinal: [ ] abdominal pain, [ ]nausea, [ ]vomiting,  [ ]diarrhea,[ ]constipation  Genitourinary: [ ] dysuria, [ ] hematuria  Neurologic: [ ] headaches [ ] tremors[ ] weakness  Skin: [ ] itching, [ ]burning, [ ] rashes  Endocrine: [ ] heat or cold intolerance  Musculoskeletal: [ ] joint pain or swelling; [ ] muscle, back, or extremity pain  Psychiatric: [ ] depression, [ ]anxiety, [ ]mood swings, or [ ]difficulty sleeping  Hematologic: [ ] easy bruising, [ ] bleeding gums       [ x] All others negative	  [ ] Unable to obtain    Vital Signs Last 24 Hrs  T(C): 36.8 (19 Dec 2018 08:16), Max: 36.8 (19 Dec 2018 08:16)  T(F): 98.2 (19 Dec 2018 08:16), Max: 98.2 (19 Dec 2018 08:16)  HR: 68 (19 Dec 2018 08:16) (68 - 120)  BP: 95/70 (19 Dec 2018 08:16) (95/70 - 139/99)RR: 18 (19 Dec 2018 08:16) (16 - 18)  SpO2: 100% (19 Dec 2018 08:16) (98% - 100%)  I&O's Summary      PHYSICAL EXAM:  General: No acute distress BMI-27.1  HEENT: EOMI, PERRL[ ] Icteric  Neck: Supple, No JVD  Lungs: Equal air entry bilaterally; [ ] Rales [ ] Rhonchi [ ] Wheezing  Heart: Regular rate and rhythm;[x ] Murmurs-  2 /6 [x ] Systolic [ ] Diastolic [ ] Radiation,No rubs, or gallops  Abdomen: Nontender, bowel sounds present  Extremities: No clubbing, cyanosis, or edema[ ] Calf tenderness  Nervous system:  Alert & Oriented X3, no focal deficits  Psychiatric: Normal affect  Skin: No rashes or lesions      LABS:  12-19    142  |  107  |  12  ----------------------------<  108<H>  4.2   |  29  |  1.01    Ca    8.9      19 Dec 2018 05:52    TPro  7.1  /  Alb  3.7  /  TBili  0.4  /  DBili  x   /  AST  22  /  ALT  24  /  AlkPhos  57  12-19    Creatinine Trend: 1.01<--, 1.07<--                        15.2   6.4   )-----------( 174      ( 19 Dec 2018 05:52 )             47.1     PT/INR - ( 19 Dec 2018 00:29 )   PT: 11.2 sec;   INR: 1.01 ratio         PTT - ( 19 Dec 2018 00:29 )  PTT:32.7 sec    Lipid Panel: Cholesterol, Serum 173  Direct LDL 86  HDL Cholesterol, Serum 75  Triglycerides, Serum 62    Cardiac Enzymes: CARDIAC MARKERS ( 19 Dec 2018 11:48 )  0.047 ng/mL / x     / 68 U/L / x     / <1.0 ng/mL  CARDIAC MARKERS ( 19 Dec 2018 05:52 )  0.047 ng/mL / x     / 86 U/L / x     / <1.0 ng/mL  CARDIAC MARKERS ( 19 Dec 2018 00:29 )  0.046 ng/mL / x     / x     / x     / x            12-19 [id number] 5.7      RADIOLOGY:XR CHEST PORTABLE-IMPRESSION-Clear lungs.  Loop recorder in situ.    ECG [my interpretation]:Atrial Fibrillation at 140's No acute ST T wave abnormalities    ECHO:Study Date: 12/19/2018  Grossly mild global LV systolic dysfunction EF-.45-50 %   Trace mitral regurgitation.  Normal left atrium  Normal right ventricular size and function.
EP ATTENDING      HISTORY OF PRESENT ILLNESS: He is a pleasant 67 y/o male PMH symptomatic PAF for which his electrophysiologist (Dr. Jonathan Leal at [facility]) implanted an ILR in 2015 for afib monitoring. It is unclear to my why he is not taking anticoagulation, nor why an afib ablation has not yet been discussed. He is now admitted with symptomatic PAF, which has already terminated to NSR. His TSH is normal, and echo shows LVEF 45-50%. CHADS-VASC is 3 for age and prior TIA.    PAST MEDICAL & SURGICAL HISTORY:  paroxysmal Afib  TIA  Prostate CA s/p RT (2012)  dyslipidemia    S/P LASIK surgery: 1999  H/O prostate cancer: 2012 - seed implant  H/O vitrectomy: right 2015 for retinal detachment  History of appendectomy  ILR implant    MEDICATIONS  (STANDING):  aspirin  chewable 81 milliGRAM(s) Oral daily  atorvastatin 40 milliGRAM(s) Oral at bedtime  enoxaparin Injectable 80 milliGRAM(s) SubCutaneous every 12 hours  folic acid 1 milliGRAM(s) Oral daily  metoprolol tartrate 25 milliGRAM(s) Oral two times a day  tamsulosin 0.4 milliGRAM(s) Oral at bedtime    No Known Allergies    FAMILY HISTORY:  No pertinent family history in first degree relatives  Family history of stomach cancer (Mother)  Family history of pancreatic cancer (Father)  Non-contributary for premature coronary disease or sudden cardiac death    SOCIAL HISTORY:    [ x] Non-smoker  [ ] Smoker  [ ] Alcohol      REVIEW OF SYSTEMS:  [ ]chest pain  [  ]shortness of breath  [x  ]palpitations  [  ]syncope  [ ]near syncope [ ]upper extremity weakness   [ ] lower extremity weakness  [  ]diplopia  [  ]altered mental status   [  ]fevers  [ ]chills [ ]nausea  [ ]vomitting  [  ]dysphagia    [ ]abdominal pain  [ ]melena  [ ]BRBPR    [  ]epistaxis  [  ]rash    [ ]lower extremity edema        [x ] All others negative	  [ ] Unable to obtain    PHYSICAL EXAM:  T(C): 36.3 (12-19-18 @ 21:31), Max: 36.8 (12-19-18 @ 08:16)  HR: 96 (12-19-18 @ 21:31) (68 - 120)  BP: 101/62 (12-19-18 @ 21:31) (95/70 - 139/99)  RR: 18 (12-19-18 @ 21:31) (16 - 18)  SpO2: 99% (12-19-18 @ 21:31) (98% - 100%)  Wt(kg): --    no JVD  RRR, no murmurs  CTAB  soft nt/nd  no c/c/e      TELEMETRY: 	    ECG:  	    Echo:  NST:  Cath:  	  	  LABS:	 	                          15.2   6.4   )-----------( 174      ( 19 Dec 2018 05:52 )             47.1     12-19    142  |  107  |  12  ----------------------------<  108<H>  4.2   |  29  |  1.01    Ca    8.9      19 Dec 2018 05:52    TPro  7.1  /  Alb  3.7  /  TBili  0.4  /  DBili  x   /  AST  22  /  ALT  24  /  AlkPhos  57  12-19    proBNP:   Lipid Profile:   HgA1c: Hemoglobin A1C, Whole Blood: 5.7 % (12-19 @ 09:15)    TSH: Thyroid Stimulating Hormone, Serum: 1.85 uU/mL (12-19 @ 05:52)      A/P) He is a pleasant 67 y/o male PMH symptomatic PAF for which his electrophysiologist (Dr. Jonathan Leal at [facility]) implanted an ILR in 2015 for afib monitoring. It is unclear to my why he is not taking anticoagulation, nor why an afib ablation has not yet been discussed. He is now admitted with symptomatic PAF, which has already terminated to NSR. His TSH is normal, and echo shows LVEF 45-50%. CHADS-VASC is 3 for age and prior TIA.    -given chads-vasc 3 would start whichever NOAC is covered by his insurance for lifelong a/c  -considering he has already converted back to NSR would continue metoprolol and d/c home  -recommend outpatient afib ablation given symptomatic PAF despite medical therapy  -f/u with his electrophysiologist Dr. Jonathan Leal within 2 weeks of discharge  -no further inpatient EP workup needed (after NOAC) given that he's already converted back to NSR  -next step is atrial fibrillation ablation      Manpreet Ratliff M.D., [facility]  Cardiac Electrophysiology  [facility]  [address], [id number]  [place] [address]  [email or web address]    office [phone]  pager [phone]

## 2018-12-20 ENCOUNTER — TRANSCRIPTION ENCOUNTER (OUTPATIENT)
Age: 68
End: 2018-12-20

## 2018-12-20 VITALS
TEMPERATURE: 98 F | HEART RATE: 72 BPM | SYSTOLIC BLOOD PRESSURE: 107 MMHG | RESPIRATION RATE: 17 BRPM | DIASTOLIC BLOOD PRESSURE: 72 MMHG | OXYGEN SATURATION: 98 %

## 2018-12-20 LAB
ANION GAP SERPL CALC-SCNC: 7 MMOL/L — SIGNIFICANT CHANGE UP (ref 5–17)
BASOPHILS # BLD AUTO: 0.1 K/UL — SIGNIFICANT CHANGE UP (ref 0–0.2)
BASOPHILS NFR BLD AUTO: 1 % — SIGNIFICANT CHANGE UP (ref 0–2)
BUN SERPL-MCNC: 15 MG/DL — SIGNIFICANT CHANGE UP (ref 7–18)
CALCIUM SERPL-MCNC: 8.9 MG/DL — SIGNIFICANT CHANGE UP (ref 8.4–10.5)
CHLORIDE SERPL-SCNC: 105 MMOL/L — SIGNIFICANT CHANGE UP (ref 96–108)
CO2 SERPL-SCNC: 29 MMOL/L — SIGNIFICANT CHANGE UP (ref 22–31)
CREAT SERPL-MCNC: 1 MG/DL — SIGNIFICANT CHANGE UP (ref 0.5–1.3)
EOSINOPHIL # BLD AUTO: 0.1 K/UL — SIGNIFICANT CHANGE UP (ref 0–0.5)
EOSINOPHIL NFR BLD AUTO: 2.2 % — SIGNIFICANT CHANGE UP (ref 0–6)
GLUCOSE SERPL-MCNC: 99 MG/DL — SIGNIFICANT CHANGE UP (ref 70–99)
HCT VFR BLD CALC: 43 % — SIGNIFICANT CHANGE UP (ref 39–50)
HGB BLD-MCNC: 13.9 G/DL — SIGNIFICANT CHANGE UP (ref 13–17)
LYMPHOCYTES # BLD AUTO: 2.2 K/UL — SIGNIFICANT CHANGE UP (ref 1–3.3)
LYMPHOCYTES # BLD AUTO: 38.6 % — SIGNIFICANT CHANGE UP (ref 13–44)
MCHC RBC-ENTMCNC: 31.8 PG — SIGNIFICANT CHANGE UP (ref 27–34)
MCHC RBC-ENTMCNC: 32.3 GM/DL — SIGNIFICANT CHANGE UP (ref 32–36)
MCV RBC AUTO: 98.4 FL — SIGNIFICANT CHANGE UP (ref 80–100)
MONOCYTES # BLD AUTO: 0.5 K/UL — SIGNIFICANT CHANGE UP (ref 0–0.9)
MONOCYTES NFR BLD AUTO: 8.7 % — SIGNIFICANT CHANGE UP (ref 2–14)
NEUTROPHILS # BLD AUTO: 2.8 K/UL — SIGNIFICANT CHANGE UP (ref 1.8–7.4)
NEUTROPHILS NFR BLD AUTO: 49.5 % — SIGNIFICANT CHANGE UP (ref 43–77)
PLATELET # BLD AUTO: 163 K/UL — SIGNIFICANT CHANGE UP (ref 150–400)
POTASSIUM SERPL-MCNC: 3.9 MMOL/L — SIGNIFICANT CHANGE UP (ref 3.5–5.3)
POTASSIUM SERPL-SCNC: 3.9 MMOL/L — SIGNIFICANT CHANGE UP (ref 3.5–5.3)
RBC # BLD: 4.37 M/UL — SIGNIFICANT CHANGE UP (ref 4.2–5.8)
RBC # FLD: 12.3 % — SIGNIFICANT CHANGE UP (ref 10.3–14.5)
SODIUM SERPL-SCNC: 141 MMOL/L — SIGNIFICANT CHANGE UP (ref 135–145)
WBC # BLD: 5.6 K/UL — SIGNIFICANT CHANGE UP (ref 3.8–10.5)
WBC # FLD AUTO: 5.6 K/UL — SIGNIFICANT CHANGE UP (ref 3.8–10.5)

## 2018-12-20 PROCEDURE — 99285 EMERGENCY DEPT VISIT HI MDM: CPT | Mod: 25

## 2018-12-20 PROCEDURE — 82550 ASSAY OF CK (CPK): CPT

## 2018-12-20 PROCEDURE — 96372 THER/PROPH/DIAG INJ SC/IM: CPT | Mod: XU

## 2018-12-20 PROCEDURE — 84443 ASSAY THYROID STIM HORMONE: CPT

## 2018-12-20 PROCEDURE — 85730 THROMBOPLASTIN TIME PARTIAL: CPT

## 2018-12-20 PROCEDURE — 96374 THER/PROPH/DIAG INJ IV PUSH: CPT

## 2018-12-20 PROCEDURE — 71045 X-RAY EXAM CHEST 1 VIEW: CPT

## 2018-12-20 PROCEDURE — 82553 CREATINE MB FRACTION: CPT

## 2018-12-20 PROCEDURE — 82607 VITAMIN B-12: CPT

## 2018-12-20 PROCEDURE — 82746 ASSAY OF FOLIC ACID SERUM: CPT

## 2018-12-20 PROCEDURE — 93005 ELECTROCARDIOGRAM TRACING: CPT

## 2018-12-20 PROCEDURE — 93306 TTE W/DOPPLER COMPLETE: CPT

## 2018-12-20 PROCEDURE — 84484 ASSAY OF TROPONIN QUANT: CPT

## 2018-12-20 PROCEDURE — 85610 PROTHROMBIN TIME: CPT

## 2018-12-20 PROCEDURE — G0378: CPT

## 2018-12-20 PROCEDURE — 80048 BASIC METABOLIC PNL TOTAL CA: CPT

## 2018-12-20 PROCEDURE — 80053 COMPREHEN METABOLIC PANEL: CPT

## 2018-12-20 PROCEDURE — 85027 COMPLETE CBC AUTOMATED: CPT

## 2018-12-20 PROCEDURE — 83036 HEMOGLOBIN GLYCOSYLATED A1C: CPT

## 2018-12-20 PROCEDURE — 80061 LIPID PANEL: CPT

## 2018-12-20 RX ORDER — METOPROLOL TARTRATE 50 MG
1 TABLET ORAL
Qty: 0 | Refills: 0 | DISCHARGE
Start: 2018-12-20 | End: 2019-01-18

## 2018-12-20 RX ORDER — ATORVASTATIN CALCIUM 80 MG/1
1 TABLET, FILM COATED ORAL
Qty: 30 | Refills: 0
Start: 2018-12-20 | End: 2019-01-18

## 2018-12-20 RX ORDER — APIXABAN 2.5 MG/1
1 TABLET, FILM COATED ORAL
Qty: 60 | Refills: 0
Start: 2018-12-20 | End: 2019-01-18

## 2018-12-20 RX ORDER — METOPROLOL TARTRATE 50 MG
1 TABLET ORAL
Qty: 60 | Refills: 0
Start: 2018-12-20 | End: 2019-01-18

## 2018-12-20 RX ADMIN — ENOXAPARIN SODIUM 80 MILLIGRAM(S): 100 INJECTION SUBCUTANEOUS at 11:51

## 2018-12-20 RX ADMIN — Medication 1 MILLIGRAM(S): at 11:51

## 2018-12-20 RX ADMIN — Medication 81 MILLIGRAM(S): at 11:51

## 2018-12-20 NOTE — DISCHARGE NOTE ADULT - HOSPITAL COURSE
69 yo male with PMH of Paroxysmal  A fib (not on AC), TIA and Prostate CA (s/p RT in 2012) presented to the ED with heart palpitations,  intermittent shortness of breath, diaphoresis. Patient has an indwelling cardiac monitor since 2015, last interrogation was 2 months ago and was told result normal. His cardiologist is DR. Raheem Patiño. In ED, pt vitals stable except , EKG showed  atrial  fibrillation  with RVR of  140s, no st-t wave changes. Troponin mild elevated 0.046. Lovenox 80mg and Cardizem 20mg IV given at ED. Mild elevation of troponin were noted. Echocardiography showed ejection fraction of 45-50% with trace pulmonic and tricuspid regurgitation. CHADS-VASC is 3 for age and prior TIA. TSH is normal. Pt has been started on NOAC. Pt will follow up with his electrophysiologist Dr. Jonathan Leal within 2 weeks of discharge. Case has been discussed with the attending. Pt is stable for discharge. This is just a summary of the case. For further information please refer to pt chart document.

## 2018-12-20 NOTE — DISCHARGE NOTE ADULT - MEDICATION SUMMARY - MEDICATIONS TO STOP TAKING
I will STOP taking the medications listed below when I get home from the hospital:    diclofenac 18 mg oral capsule  -- 1 cap(s) by mouth 3 times a day, As Needed

## 2018-12-20 NOTE — DISCHARGE NOTE ADULT - PATIENT PORTAL LINK FT
You can access the [facility] Patient Portal, offered by [facility], by registering with the following website: [email or web address]

## 2018-12-20 NOTE — DISCHARGE NOTE ADULT - MEDICATION SUMMARY - MEDICATIONS TO TAKE
I will START or STAY ON the medications listed below when I get home from the hospital:    aspirin 81 mg oral tablet, chewable  -- 1 tab(s) by mouth once a day  -- Indication: For Atrial fibrillation    Flomax 0.4 mg oral capsule  -- 1 cap(s) by mouth once a day  -- Indication: For BPH    Rapaflo 8 mg oral capsule  -- 1 cap(s) by mouth once a day in pm  -- Indication: For BPH    apixaban 5 mg oral tablet  -- 1 tab(s) by mouth 2 times a day   -- Check with your doctor before becoming pregnant.  It is very important that you take or use this exactly as directed.  Do not skip doses or discontinue unless directed by your doctor.  Obtain medical advice before taking any non-prescription drugs as some may affect the action of this medication.    -- Indication: For Atrial fibrillation with RVR    atorvastatin 40 mg oral tablet  -- 1 tab(s) by mouth once a day (at bedtime)  -- Indication: For Hyperlipidemia    metoprolol tartrate 25 mg oral tablet  -- 1 tab(s) by mouth 2 times a day  -- Indication: For Atrial fibrillation    folic acid 1 mg oral tablet  -- 1 tab(s) by mouth once a day  -- Indication: For Prophylactic measure

## 2018-12-20 NOTE — DISCHARGE NOTE ADULT - CARE PLAN
Principal Discharge DX:	Atrial fibrillation with RVR  Goal:	To maintain normal sinus rhythm and prevent complications  Assessment and plan of treatment:	You presented with palpitation, intermittent shortness of breath, diaphoresis. EKG showed atrial fibrillation with rapid ventricular response. Echocardiography showed ejection fraction of 45-50% with trace pulmonic and tricuspid regurgitation. Mild elevation of troponin likely due to atrial fibrillation were noted. Electrophysiologist was consulted and anticoagulant was recommended. You should continue with medication as above. You should follow up with electrophysiologist Dr. Jonathan Leal within 2 weeks of discharge.

## 2018-12-20 NOTE — DISCHARGE NOTE ADULT - PLAN OF CARE
You presented with palpitation, intermittent shortness of breath, diaphoresis. EKG showed atrial fibrillation with rapid ventricular response. Echocardiography showed ejection fraction of 45-50% with trace pulmonic and tricuspid regurgitation. Mild elevation of troponin likely due to atrial fibrillation were noted. Electrophysiologist was consulted and anticoagulant was recommended. You should continue with medication as above. You should follow up with electrophysiologist Dr. Jonathan Leal within 2 weeks of discharge. To maintain normal sinus rhythm and prevent complications

## 2019-01-02 ENCOUNTER — APPOINTMENT (OUTPATIENT)
Dept: ORTHOPEDIC SURGERY | Facility: CLINIC | Age: 69
End: 2019-01-02

## 2019-01-11 ENCOUNTER — APPOINTMENT (OUTPATIENT)
Dept: ORTHOPEDIC SURGERY | Facility: CLINIC | Age: 69
End: 2019-01-11
Payer: COMMERCIAL

## 2019-01-11 PROCEDURE — 20610 DRAIN/INJ JOINT/BURSA W/O US: CPT | Mod: RT

## 2019-01-11 NOTE — PROCEDURE
[de-identified] : Euflexxa # 1 Right Knee\par Discussed at length with the patient the planned Euflexxa injection. The risks, benefits, convalescence and alternatives were reviewed. The possible side effects discussed included but were not limited to: pain, swelling, heat and redness. There symptoms are generally mild but if they are extensive then contact the office. Giving pain relievers by mouth such as NSAID’s or Tylenol can generally treat the reactions to Euflexxa. Rare cases of infection have been noted. Rash, hives and itching may occur post injection. If you have muscle pain or cramps, flushing and or swelling of the face, rapid heart beat, nausea, dizziness, fever, chills, headache, difficulty breathing, swelling in the arms or legs, or have a prickly feeling of your skin, contact a health care provider immediately.\par \par Following this discussion, the knee was prepped with betadine and under sterile condition the Euflexxa injection was performed with a 22 gauge needle. The needle was introduced into the joint, aspiration was performed to ensure intra-articular placement and the medication was injected. Upon withdrawal of the needle the site was cleaned with alcohol and a bandaid applied. The patient tolerated the injection well and there were no adverse effects. Post injection instructions included no strenuous activity for 24 hours, cryotherapy and if there are any adverse effects to contact the office.\par

## 2019-01-18 ENCOUNTER — APPOINTMENT (OUTPATIENT)
Dept: ORTHOPEDIC SURGERY | Facility: CLINIC | Age: 69
End: 2019-01-18
Payer: COMMERCIAL

## 2019-01-18 PROCEDURE — 20610 DRAIN/INJ JOINT/BURSA W/O US: CPT | Mod: RT

## 2019-01-18 NOTE — PROCEDURE
[de-identified] : Euflexxa # 2 Right Knee\par Discussed at length with the patient the planned Euflexxa injection. The risks, benefits, convalescence and alternatives were reviewed. The possible side effects discussed included but were not limited to: pain, swelling, heat and redness. There symptoms are generally mild but if they are extensive then contact the office. Giving pain relievers by mouth such as NSAID’s or Tylenol can generally treat the reactions to Euflexxa. Rare cases of infection have been noted. Rash, hives and itching may occur post injection. If you have muscle pain or cramps, flushing and or swelling of the face, rapid heart beat, nausea, dizziness, fever, chills, headache, difficulty breathing, swelling in the arms or legs, or have a prickly feeling of your skin, contact a health care provider immediately.\par \par Following this discussion, the knee was prepped with betadine and under sterile condition the Euflexxa injection was performed with a 22 gauge needle. The needle was introduced into the joint, aspiration was performed to ensure intra-articular placement and the medication was injected. Upon withdrawal of the needle the site was cleaned with alcohol and a bandaid applied. The patient tolerated the injection well and there were no adverse effects. Post injection instructions included no strenuous activity for 24 hours, cryotherapy and if there are any adverse effects to contact the office.\par

## 2019-01-25 ENCOUNTER — APPOINTMENT (OUTPATIENT)
Dept: ORTHOPEDIC SURGERY | Facility: CLINIC | Age: 69
End: 2019-01-25
Payer: COMMERCIAL

## 2019-01-25 ENCOUNTER — APPOINTMENT (OUTPATIENT)
Dept: ORTHOPEDIC SURGERY | Facility: CLINIC | Age: 69
End: 2019-01-25

## 2019-01-25 PROCEDURE — 20610 DRAIN/INJ JOINT/BURSA W/O US: CPT | Mod: RT

## 2019-01-25 RX ORDER — DICLOFENAC SODIUM 10 MG/G
1 GEL TOPICAL DAILY
Qty: 1 | Refills: 2 | Status: DISCONTINUED | COMMUNITY
Start: 2019-01-25 | End: 2019-01-25

## 2019-01-25 RX ORDER — DICLOFENAC SODIUM 10 MG/G
1 GEL TOPICAL DAILY
Qty: 1 | Refills: 2 | Status: ACTIVE | COMMUNITY
Start: 2019-01-25 | End: 1900-01-01

## 2019-01-25 NOTE — PROCEDURE
[de-identified] : Euflexxa # 3 Right knee\par Discussed at length with the patient the planned Euflexxa injection. The risks, benefits, convalescence and alternatives were reviewed. The possible side effects discussed included but were not limited to: pain, swelling, heat and redness. There symptoms are generally mild but if they are extensive then contact the office. Giving pain relievers by mouth such as NSAID’s or Tylenol can generally treat the reactions to Euflexxa. Rare cases of infection have been noted. Rash, hives and itching may occur post injection. If you have muscle pain or cramps, flushing and or swelling of the face, rapid heart beat, nausea, dizziness, fever, chills, headache, difficulty breathing, swelling in the arms or legs, or have a prickly feeling of your skin, contact a health care provider immediately.\par \par Following this discussion, the knee was prepped with betadine and under sterile condition the Euflexxa injection was performed with a 22 gauge needle. The needle was introduced into the joint, aspiration was performed to ensure intra-articular placement and the medication was injected. Upon withdrawal of the needle the site was cleaned with alcohol and a bandaid applied. The patient tolerated the injection well and there were no adverse effects. Post injection instructions included no strenuous activity for 24 hours, cryotherapy and if there are any adverse effects to contact the office.\par

## 2019-01-29 ENCOUNTER — APPOINTMENT (OUTPATIENT)
Dept: OPHTHALMOLOGY | Facility: CLINIC | Age: 69
End: 2019-01-29
Payer: COMMERCIAL

## 2019-01-29 PROCEDURE — 92002 INTRM OPH EXAM NEW PATIENT: CPT

## 2019-02-19 ENCOUNTER — APPOINTMENT (OUTPATIENT)
Dept: OPHTHALMOLOGY | Facility: CLINIC | Age: 69
End: 2019-02-19
Payer: COMMERCIAL

## 2019-02-19 PROCEDURE — 92014 COMPRE OPH EXAM EST PT 1/>: CPT

## 2019-07-22 ENCOUNTER — APPOINTMENT (OUTPATIENT)
Dept: ORTHOPEDIC SURGERY | Facility: CLINIC | Age: 69
End: 2019-07-22

## 2019-08-01 NOTE — PATIENT PROFILE ADULT - NSTRANSFERBELONGINGSDISPO_GEN_A_NUR
Problem: PAIN - ADULT  Goal: Verbalizes/displays adequate comfort level or baseline comfort level  Description  Interventions:  - Encourage patient to monitor pain and request assistance  - Assess pain using appropriate pain scale  - Administer analgesics based on type and severity of pain and evaluate response  - Implement non-pharmacological measures as appropriate and evaluate response  - Consider cultural and social influences on pain and pain management  - Notify physician/advanced practitioner if interventions unsuccessful or patient reports new pain  Outcome: Progressing     Problem: INFECTION - ADULT  Goal: Absence or prevention of progression during hospitalization  Description  INTERVENTIONS:  - Assess and monitor for signs and symptoms of infection  - Monitor lab/diagnostic results  - Monitor all insertion sites, i e  indwelling lines, tubes, and drains  - Monitor endotracheal (as able) and nasal secretions for changes in amount and color  - [place] appropriate cooling/warming therapies per order  - Administer medications as ordered  - Instruct and encourage patient and family to use good hand hygiene technique  - Identify and instruct in appropriate isolation precautions for identified infection/condition  Outcome: Progressing     Problem: SAFETY ADULT  Goal: Patient will remain free of falls  Description  INTERVENTIONS:  - Assess patient frequently for physical needs  -  Identify cognitive and physical deficits and behaviors that affect risk of falls    -  [place] fall precautions as indicated by assessment   - Educate patient/family on patient safety including physical limitations  - Instruct patient to call for assistance with activity based on assessment  - Modify environment to reduce risk of injury  - Consider OT/PT consult to assist with strengthening/mobility  Outcome: Progressing  Goal: Maintain or return to baseline ADL function  Description  INTERVENTIONS:  -  Assess patient's ability to carry out ADLs; assess patient's baseline for ADL function and identify physical deficits which impact ability to perform ADLs (bathing, care of mouth/teeth, toileting, grooming, dressing, etc )  - Assess/evaluate cause of self-care deficits   - Assess range of motion  - Assess patient's mobility; develop plan if impaired  - Assess patient's need for assistive devices and provide as appropriate  - Encourage maximum independence but intervene and supervise when necessary  ¯ Involve family in performance of ADLs  ¯ Assess for home care needs following discharge   ¯ Request OT consult to assist with ADL evaluation and planning for discharge  ¯ Provide patient education as appropriate  Outcome: Progressing  Goal: Maintain or return mobility status to optimal level  Description  INTERVENTIONS:  - Assess patient's baseline mobility status (ambulation, transfers, stairs, etc )    - Identify cognitive and physical deficits and behaviors that affect mobility  - Identify mobility aids required to assist with transfers and/or ambulation (gait belt, sit-to-stand, lift, walker, cane, etc )  - [place] fall precautions as indicated by assessment  - Record patient progress and toleration of activity level on Mobility SBAR; progress patient to next Phase/Stage  - Instruct patient to call for assistance with activity based on assessment  - Request Rehabilitation consult to assist with strengthening/weightbearing, etc   Outcome: Progressing     Problem: DISCHARGE PLANNING  Goal: Discharge to home or other facility with appropriate resources  Description  INTERVENTIONS:  - Identify barriers to discharge w/patient and caregiver  - Arrange for needed discharge resources and transportation as appropriate  - Identify discharge learning needs (meds, wound care, etc )  - Arrange for interpretive services to assist at discharge as needed  - Refer to Case Management Department for coordinating discharge planning if the patient needs post-hospital services based on physician/advanced practitioner order or complex needs related to functional status, cognitive ability, or social support system  Outcome: Progressing     Problem: Knowledge Deficit  Goal: Patient/family/caregiver demonstrates understanding of disease process, treatment plan, medications, and discharge instructions  Description  Complete learning assessment and assess knowledge base  Interventions:  - Provide teaching at level of understanding  - Provide teaching via preferred learning methods  Outcome: Progressing     Problem: Potential for Falls  Goal: Patient will remain free of falls  Description  INTERVENTIONS:  - Assess patient frequently for physical needs  -  Identify cognitive and physical deficits and behaviors that affect risk of falls  -  [place] fall precautions as indicated by assessment   - Educate patient/family on patient safety including physical limitations  - Instruct patient to call for assistance with activity based on assessment  - Modify environment to reduce risk of injury  - Consider OT/PT consult to assist with strengthening/mobility  Outcome: Progressing     Problem: Nutrition/Hydration-ADULT  Goal: Nutrient/Hydration intake appropriate for improving, restoring or maintaining nutritional needs  Description  Monitor and assess patient's nutrition/hydration status for malnutrition (ex- brittle hair, bruises, dry skin, pale skin and conjunctiva, muscle wasting, smooth red tongue, and disorientation)  Collaborate with interdisciplinary team and initiate plan and interventions as ordered  Monitor patient's weight and dietary intake as ordered or per policy  Utilize nutrition screening tool and intervene per policy  Determine patient's food preferences and provide high-protein, high-caloric foods as appropriate       INTERVENTIONS:  - Monitor oral intake, urinary output, labs, and treatment plans  - Assess nutrition and hydration status and recommend course of action  - Evaluate amount of meals eaten  - Assist patient with eating if necessary   - Allow adequate time for meals  - Recommend/ encourage appropriate diets, oral nutritional supplements, and vitamin/mineral supplements  - Order, calculate, and assess calorie counts as needed  - Recommend, monitor, and adjust tube feedings and TPN/PPN based on assessed needs  - Assess need for intravenous fluids  - Provide specific nutrition/hydration education as appropriate  - Include patient/family/caregiver in decisions related to nutrition  Outcome: Progressing     Problem: BEHAVIOR  Goal: Pt/Family maintain appropriate behavior and adhere to behavioral management agreement, if implemented  Description  INTERVENTIONS:  - Assess the family dynamic   - Encourage verbalization of thoughts and concerns in a socially appropriate manner  - Assess patient/family's coping skills and non-compliant behavior (including use of illegal substances)  - Utilize positive, consistent limit setting strategies supporting safety of patient, staff and others  - Initiate consult with Case Management, Spiritual Care or other ancillary services as appropriate  - If a patient's/visitor's behavior jeopardizes the safety of the patient, staff, or others, refer to organization procedure     - Notify Security of behavior or suspected illegal substances which indicate the need for search of the patient and/or belongings  - Encourage participation in the decision making process about a behavioral management agreement; implement if patient meets criteria  Outcome: Progressing     Problem: Prexisting or High Potential for Compromised Skin Integrity  Goal: Skin integrity is maintained or improved  Description  INTERVENTIONS:  - Identify patients at risk for skin breakdown  - Assess and monitor skin integrity  - Assess and monitor nutrition and hydration status  - Monitor labs (i e  albumin)  - Assess for incontinence   - Turn and reposition patient  - Assist with mobility/ambulation  - Relieve pressure over bony prominences  - Avoid friction and shearing  - Provide appropriate hygiene as needed including keeping skin clean and dry  - Evaluate need for skin moisturizer/barrier cream  - Collaborate with interdisciplinary team (i e  Nutrition, Rehabilitation, etc )   - Patient/family teaching  Outcome: Progressing with patient

## 2019-08-13 ENCOUNTER — NON-APPOINTMENT (OUTPATIENT)
Age: 69
End: 2019-08-13

## 2019-08-13 ENCOUNTER — APPOINTMENT (OUTPATIENT)
Dept: OPHTHALMOLOGY | Facility: CLINIC | Age: 69
End: 2019-08-13
Payer: COMMERCIAL

## 2019-08-13 PROCEDURE — 92014 COMPRE OPH EXAM EST PT 1/>: CPT

## 2019-09-18 ENCOUNTER — APPOINTMENT (OUTPATIENT)
Dept: ORTHOPEDIC SURGERY | Facility: CLINIC | Age: 69
End: 2019-09-18
Payer: COMMERCIAL

## 2019-09-18 PROCEDURE — 73560 X-RAY EXAM OF KNEE 1 OR 2: CPT | Mod: LT

## 2019-09-18 PROCEDURE — 99213 OFFICE O/P EST LOW 20 MIN: CPT

## 2019-09-18 PROCEDURE — 73564 X-RAY EXAM KNEE 4 OR MORE: CPT | Mod: RT

## 2019-09-18 NOTE — DISCUSSION/SUMMARY
[de-identified] : Discussed at length the nature of the patients condition. Their right knee symptoms appear secondary to degenerative arthritis. We will continue with nonoperative treatment. Since he's had a good prior response, we will seek authorization for another series of right knee Euflexxa injections. Once we receive authorization, we will proceed accordingly. I also suggested continuation of a home exercise program, and we have provided a new prescription for Voltaren Gel as he cannot take anti-inflammatory medications due to being on Eliquis. They can continue activities as tolerated. \par

## 2019-09-18 NOTE — PHYSICAL EXAM
[de-identified] : Right knee\par Inspection: mild effusion \par Wounds: healed arthroscopic portals \par Alignment: normal.\par Palpation: medial and lateral  tenderness on palpation.\par ROM: Active (in degrees) 0-135 with crepitus\par Ligamentous laxity (neg): all ligaments appear stable, negative ant. drawer test, negative post. drawer test, stable to varus stress test, stable to valgus stress test, negative Lachman's test, negative pivot shift test,\par Meniscal Test: negative McMurrays, negative Candelaria.\par Patellofemoral Alignment Test: Q angle-, normal.\par Muscle Test: good quad strength.\par Leg examination: calf is soft and non-tender. [de-identified] : Right Knee xrays, taken at the office today show:, standing AP/Lateral and Merchant films, and 45 degree PA standing view, normal alignment, lateral joint space narrowing especially on the Back view, small peripheral osteophytes, patellofemoral joint space narrowing with lateral tracking, small marginal osteophytes, Kellgren and Dejan grade 2-3\par \par Left knee merchant view taken at the office today demonstrates lateral tracking patella with lateral joint space narrowing, small marginal osteophytes consistent with patellofemoral degenerative arthritis.

## 2019-09-18 NOTE — HISTORY OF PRESENT ILLNESS
[de-identified] : 69 year old male presents for follow up evaluation of his right arthritic knee. Patient has been undergoing nonoperative treatment. He underwent a series of Euflexxa in January 2019 which provided good relief of his pain. He states his right knee pain has returned and he would like to repeat the viscosupplementation at this time. He recently underwent a cardiac ablation for an arrhythmia in August 2019. Patient was using Diclofenac as an anti-inflammatory medication but had to discontinue as per his cardiologist, as he is now on Eliquis. He has been using Voltaren Gel BID with good improvement and would like a new prescription. Patient has been staying active with gym exercise.

## 2019-09-18 NOTE — ADDENDUM
[FreeTextEntry1] : This note was written by Asha Geiger on 09/18/2019 acting as scribe for Dr. Suman King M.D.\par \par I, Dr. Suman King M.D., have read and attest that all the information, medical decision making and discharge instructions within are true and accurate.\par

## 2019-10-11 ENCOUNTER — APPOINTMENT (OUTPATIENT)
Dept: OPHTHALMOLOGY | Facility: CLINIC | Age: 69
End: 2019-10-11

## 2019-11-05 ENCOUNTER — INPATIENT (INPATIENT)
Facility: HOSPITAL | Age: 69
LOS: 1 days | Discharge: HOME | DRG: 74 | End: 2019-11-07
Attending: INTERNAL MEDICINE | Admitting: INTERNAL MEDICINE
Payer: COMMERCIAL

## 2019-11-05 VITALS
TEMPERATURE: 98 F | HEART RATE: 85 BPM | WEIGHT: 199.96 LBS | OXYGEN SATURATION: 96 % | HEIGHT: 68 IN | SYSTOLIC BLOOD PRESSURE: 136 MMHG | RESPIRATION RATE: 20 BRPM | DIASTOLIC BLOOD PRESSURE: 85 MMHG

## 2019-11-05 DIAGNOSIS — Z85.46 PERSONAL HISTORY OF MALIGNANT NEOPLASM OF PROSTATE: Chronic | ICD-10-CM

## 2019-11-05 DIAGNOSIS — Z98.89 OTHER SPECIFIED POSTPROCEDURAL STATES: Chronic | ICD-10-CM

## 2019-11-05 DIAGNOSIS — I63.9 CEREBRAL INFARCTION, UNSPECIFIED: ICD-10-CM

## 2019-11-05 DIAGNOSIS — Z90.49 ACQUIRED ABSENCE OF OTHER SPECIFIED PARTS OF DIGESTIVE TRACT: Chronic | ICD-10-CM

## 2019-11-05 LAB
ALBUMIN SERPL ELPH-MCNC: 3.8 G/DL — SIGNIFICANT CHANGE UP (ref 3.5–5)
ALBUMIN SERPL ELPH-MCNC: 4.1 G/DL — SIGNIFICANT CHANGE UP (ref 3.5–5)
ALP SERPL-CCNC: 50 U/L — SIGNIFICANT CHANGE UP (ref 40–120)
ALP SERPL-CCNC: 56 U/L — SIGNIFICANT CHANGE UP (ref 40–120)
ALT FLD-CCNC: 17 U/L DA — SIGNIFICANT CHANGE UP (ref 10–60)
ALT FLD-CCNC: 18 U/L DA — SIGNIFICANT CHANGE UP (ref 10–60)
ANION GAP SERPL CALC-SCNC: 4 MMOL/L — LOW (ref 5–17)
ANION GAP SERPL CALC-SCNC: 5 MMOL/L — SIGNIFICANT CHANGE UP (ref 5–17)
AST SERPL-CCNC: 11 U/L — SIGNIFICANT CHANGE UP (ref 10–40)
AST SERPL-CCNC: 12 U/L — SIGNIFICANT CHANGE UP (ref 10–40)
BASOPHILS # BLD AUTO: 0.01 K/UL — SIGNIFICANT CHANGE UP (ref 0–0.2)
BASOPHILS # BLD AUTO: 0.02 K/UL — SIGNIFICANT CHANGE UP (ref 0–0.2)
BASOPHILS NFR BLD AUTO: 0.2 % — SIGNIFICANT CHANGE UP (ref 0–2)
BASOPHILS NFR BLD AUTO: 0.5 % — SIGNIFICANT CHANGE UP (ref 0–2)
BILIRUB SERPL-MCNC: 0.6 MG/DL — SIGNIFICANT CHANGE UP (ref 0.2–1.2)
BILIRUB SERPL-MCNC: 0.9 MG/DL — SIGNIFICANT CHANGE UP (ref 0.2–1.2)
BUN SERPL-MCNC: 13 MG/DL — SIGNIFICANT CHANGE UP (ref 7–18)
BUN SERPL-MCNC: 13 MG/DL — SIGNIFICANT CHANGE UP (ref 7–18)
CALCIUM SERPL-MCNC: 9.2 MG/DL — SIGNIFICANT CHANGE UP (ref 8.4–10.5)
CALCIUM SERPL-MCNC: 9.3 MG/DL — SIGNIFICANT CHANGE UP (ref 8.4–10.5)
CHLORIDE SERPL-SCNC: 106 MMOL/L — SIGNIFICANT CHANGE UP (ref 96–108)
CHLORIDE SERPL-SCNC: 107 MMOL/L — SIGNIFICANT CHANGE UP (ref 96–108)
CHOLEST SERPL-MCNC: 173 MG/DL — SIGNIFICANT CHANGE UP (ref 10–199)
CO2 SERPL-SCNC: 29 MMOL/L — SIGNIFICANT CHANGE UP (ref 22–31)
CO2 SERPL-SCNC: 29 MMOL/L — SIGNIFICANT CHANGE UP (ref 22–31)
CREAT SERPL-MCNC: 1.02 MG/DL — SIGNIFICANT CHANGE UP (ref 0.5–1.3)
CREAT SERPL-MCNC: 1.05 MG/DL — SIGNIFICANT CHANGE UP (ref 0.5–1.3)
EOSINOPHIL # BLD AUTO: 0.02 K/UL — SIGNIFICANT CHANGE UP (ref 0–0.5)
EOSINOPHIL # BLD AUTO: 0.05 K/UL — SIGNIFICANT CHANGE UP (ref 0–0.5)
EOSINOPHIL NFR BLD AUTO: 0.5 % — SIGNIFICANT CHANGE UP (ref 0–6)
EOSINOPHIL NFR BLD AUTO: 0.9 % — SIGNIFICANT CHANGE UP (ref 0–6)
GLUCOSE SERPL-MCNC: 111 MG/DL — HIGH (ref 70–99)
GLUCOSE SERPL-MCNC: 92 MG/DL — SIGNIFICANT CHANGE UP (ref 70–99)
HCT VFR BLD CALC: 43.8 % — SIGNIFICANT CHANGE UP (ref 39–50)
HCT VFR BLD CALC: 45.6 % — SIGNIFICANT CHANGE UP (ref 39–50)
HDLC SERPL-MCNC: 83 MG/DL — SIGNIFICANT CHANGE UP
HGB BLD-MCNC: 14.3 G/DL — SIGNIFICANT CHANGE UP (ref 13–17)
HGB BLD-MCNC: 15 G/DL — SIGNIFICANT CHANGE UP (ref 13–17)
IMM GRANULOCYTES NFR BLD AUTO: 0.2 % — SIGNIFICANT CHANGE UP (ref 0–1.5)
IMM GRANULOCYTES NFR BLD AUTO: 0.2 % — SIGNIFICANT CHANGE UP (ref 0–1.5)
LIPID PNL WITH DIRECT LDL SERPL: 78 MG/DL — SIGNIFICANT CHANGE UP
LYMPHOCYTES # BLD AUTO: 0.89 K/UL — LOW (ref 1–3.3)
LYMPHOCYTES # BLD AUTO: 1.62 K/UL — SIGNIFICANT CHANGE UP (ref 1–3.3)
LYMPHOCYTES # BLD AUTO: 21.3 % — SIGNIFICANT CHANGE UP (ref 13–44)
LYMPHOCYTES # BLD AUTO: 29.8 % — SIGNIFICANT CHANGE UP (ref 13–44)
MAGNESIUM SERPL-MCNC: 2.2 MG/DL — SIGNIFICANT CHANGE UP (ref 1.6–2.6)
MCHC RBC-ENTMCNC: 30.9 PG — SIGNIFICANT CHANGE UP (ref 27–34)
MCHC RBC-ENTMCNC: 31 PG — SIGNIFICANT CHANGE UP (ref 27–34)
MCHC RBC-ENTMCNC: 32.6 GM/DL — SIGNIFICANT CHANGE UP (ref 32–36)
MCHC RBC-ENTMCNC: 32.9 GM/DL — SIGNIFICANT CHANGE UP (ref 32–36)
MCV RBC AUTO: 94 FL — SIGNIFICANT CHANGE UP (ref 80–100)
MCV RBC AUTO: 94.8 FL — SIGNIFICANT CHANGE UP (ref 80–100)
MONOCYTES # BLD AUTO: 0.36 K/UL — SIGNIFICANT CHANGE UP (ref 0–0.9)
MONOCYTES # BLD AUTO: 0.41 K/UL — SIGNIFICANT CHANGE UP (ref 0–0.9)
MONOCYTES NFR BLD AUTO: 7.5 % — SIGNIFICANT CHANGE UP (ref 2–14)
MONOCYTES NFR BLD AUTO: 8.6 % — SIGNIFICANT CHANGE UP (ref 2–14)
NEUTROPHILS # BLD AUTO: 2.88 K/UL — SIGNIFICANT CHANGE UP (ref 1.8–7.4)
NEUTROPHILS # BLD AUTO: 3.34 K/UL — SIGNIFICANT CHANGE UP (ref 1.8–7.4)
NEUTROPHILS NFR BLD AUTO: 61.4 % — SIGNIFICANT CHANGE UP (ref 43–77)
NEUTROPHILS NFR BLD AUTO: 68.9 % — SIGNIFICANT CHANGE UP (ref 43–77)
NRBC # BLD: 0 /100 WBCS — SIGNIFICANT CHANGE UP (ref 0–0)
NRBC # BLD: 0 /100 WBCS — SIGNIFICANT CHANGE UP (ref 0–0)
PHOSPHATE SERPL-MCNC: 2.9 MG/DL — SIGNIFICANT CHANGE UP (ref 2.5–4.5)
PLATELET # BLD AUTO: 186 K/UL — SIGNIFICANT CHANGE UP (ref 150–400)
PLATELET # BLD AUTO: 201 K/UL — SIGNIFICANT CHANGE UP (ref 150–400)
POTASSIUM SERPL-MCNC: 3.6 MMOL/L — SIGNIFICANT CHANGE UP (ref 3.5–5.3)
POTASSIUM SERPL-MCNC: 3.8 MMOL/L — SIGNIFICANT CHANGE UP (ref 3.5–5.3)
POTASSIUM SERPL-SCNC: 3.6 MMOL/L — SIGNIFICANT CHANGE UP (ref 3.5–5.3)
POTASSIUM SERPL-SCNC: 3.8 MMOL/L — SIGNIFICANT CHANGE UP (ref 3.5–5.3)
PROT SERPL-MCNC: 7 G/DL — SIGNIFICANT CHANGE UP (ref 6–8.3)
PROT SERPL-MCNC: 7.4 G/DL — SIGNIFICANT CHANGE UP (ref 6–8.3)
RBC # BLD: 4.62 M/UL — SIGNIFICANT CHANGE UP (ref 4.2–5.8)
RBC # BLD: 4.85 M/UL — SIGNIFICANT CHANGE UP (ref 4.2–5.8)
RBC # FLD: 12.9 % — SIGNIFICANT CHANGE UP (ref 10.3–14.5)
RBC # FLD: 13.1 % — SIGNIFICANT CHANGE UP (ref 10.3–14.5)
SODIUM SERPL-SCNC: 140 MMOL/L — SIGNIFICANT CHANGE UP (ref 135–145)
SODIUM SERPL-SCNC: 140 MMOL/L — SIGNIFICANT CHANGE UP (ref 135–145)
TOTAL CHOLESTEROL/HDL RATIO MEASUREMENT: 2.1 RATIO — LOW (ref 3.4–9.6)
TRIGL SERPL-MCNC: 59 MG/DL — SIGNIFICANT CHANGE UP (ref 10–149)
TROPONIN I SERPL-MCNC: <0.015 NG/ML — SIGNIFICANT CHANGE UP (ref 0–0.04)
TSH SERPL-MCNC: 0.96 UU/ML — SIGNIFICANT CHANGE UP (ref 0.34–4.82)
WBC # BLD: 4.18 K/UL — SIGNIFICANT CHANGE UP (ref 3.8–10.5)
WBC # BLD: 5.44 K/UL — SIGNIFICANT CHANGE UP (ref 3.8–10.5)
WBC # FLD AUTO: 4.18 K/UL — SIGNIFICANT CHANGE UP (ref 3.8–10.5)
WBC # FLD AUTO: 5.44 K/UL — SIGNIFICANT CHANGE UP (ref 3.8–10.5)

## 2019-11-05 PROCEDURE — 70496 CT ANGIOGRAPHY HEAD: CPT | Mod: 26

## 2019-11-05 PROCEDURE — 99285 EMERGENCY DEPT VISIT HI MDM: CPT

## 2019-11-05 PROCEDURE — 71045 X-RAY EXAM CHEST 1 VIEW: CPT | Mod: 26

## 2019-11-05 PROCEDURE — 99223 1ST HOSP IP/OBS HIGH 75: CPT | Mod: AI,GC

## 2019-11-05 PROCEDURE — 70498 CT ANGIOGRAPHY NECK: CPT | Mod: 26

## 2019-11-05 RX ORDER — TAMSULOSIN HYDROCHLORIDE 0.4 MG/1
0.4 CAPSULE ORAL AT BEDTIME
Refills: 0 | Status: DISCONTINUED | OUTPATIENT
Start: 2019-11-05 | End: 2019-11-07

## 2019-11-05 RX ORDER — APIXABAN 2.5 MG/1
2.5 TABLET, FILM COATED ORAL EVERY 12 HOURS
Refills: 0 | Status: DISCONTINUED | OUTPATIENT
Start: 2019-11-05 | End: 2019-11-05

## 2019-11-05 RX ORDER — APIXABAN 2.5 MG/1
5 TABLET, FILM COATED ORAL EVERY 12 HOURS
Refills: 0 | Status: DISCONTINUED | OUTPATIENT
Start: 2019-11-05 | End: 2019-11-05

## 2019-11-05 RX ORDER — ATORVASTATIN CALCIUM 80 MG/1
40 TABLET, FILM COATED ORAL AT BEDTIME
Refills: 0 | Status: DISCONTINUED | OUTPATIENT
Start: 2019-11-05 | End: 2019-11-07

## 2019-11-05 RX ORDER — APIXABAN 2.5 MG/1
5 TABLET, FILM COATED ORAL EVERY 12 HOURS
Refills: 0 | Status: DISCONTINUED | OUTPATIENT
Start: 2019-11-05 | End: 2019-11-07

## 2019-11-05 RX ORDER — ASPIRIN/CALCIUM CARB/MAGNESIUM 324 MG
325 TABLET ORAL ONCE
Refills: 0 | Status: COMPLETED | OUTPATIENT
Start: 2019-11-05 | End: 2019-11-05

## 2019-11-05 RX ORDER — FOLIC ACID 0.8 MG
1 TABLET ORAL DAILY
Refills: 0 | Status: DISCONTINUED | OUTPATIENT
Start: 2019-11-05 | End: 2019-11-07

## 2019-11-05 RX ADMIN — APIXABAN 5 MILLIGRAM(S): 2.5 TABLET, FILM COATED ORAL at 17:49

## 2019-11-05 RX ADMIN — ATORVASTATIN CALCIUM 40 MILLIGRAM(S): 80 TABLET, FILM COATED ORAL at 21:38

## 2019-11-05 RX ADMIN — Medication 325 MILLIGRAM(S): at 14:44

## 2019-11-05 RX ADMIN — TAMSULOSIN HYDROCHLORIDE 0.4 MILLIGRAM(S): 0.4 CAPSULE ORAL at 21:38

## 2019-11-05 NOTE — H&P ADULT - NSICDXPASTMEDICALHX_GEN_ALL_CORE_FT
PAST MEDICAL HISTORY:  Afib     Disease of biliary tract, unspecified     Dyslipidemia     Malignant neoplasm h/o    Obesity     Prostate CA

## 2019-11-05 NOTE — ED ADULT NURSE NOTE - CHPI ED NUR SYMPTOMS NEG
no blurred vision/no change in level of consciousness/no confusion/no dizziness/no fever/no loss of consciousness/no nausea/no vomiting/no weakness

## 2019-11-05 NOTE — H&P ADULT - HISTORY OF PRESENT ILLNESS
69M from home, PMHx of A. fib s/p ablation and loop recorder(medtronic) on Aug. 2019, HLD, prostate Ca s/p RT on 2016 presented to ED c/o Rt. arm difficulty in coordination. He states since 5:30pm yesterday, he started getting difficulty coordinating with Rt. arm, if he concentrates and picks or holds onto something using his Rt. hand, he is able to hold otherwise it is difficult for him to hold using his Rt. arm. He also complaints of numbness over lower lip and decreased sensation on his Rt. hand with difficulty articulating words since yesterday evening. He 69M from home, PMHx of A. fib s/p ablation and loop recorder(medtronic) on Aug. 2019, HLD, prostate Ca s/p RT on 2016 presented to ED c/o Rt. arm difficulty in coordination. He states since 5:30pm yesterday, he started getting difficulty coordinating with Rt. arm, if he concentrates and picks or holds onto something using his Rt. hand, he is able to hold otherwise it is difficult for him to hold using his Rt. arm. He also complaints of numbness over lower lip and decreased sensation on his Rt. hand with difficulty articulating words since yesterday evening. He states he had these symptoms on saturday that lasted for 5hrs, he called his neurologist after the episode and was told that he might have stroke. 69M from home, PMHx of A. fib s/p ablation and loop recorder(medtronic) on Aug. 2019, HLD, prostate Ca s/p RT on 2016 presented to ED c/o difficulty in coordination of Rt. arm. He states since 5:30pm yesterday, he started getting difficulty coordinating with Rt. arm, if he concentrates and picks or holds onto something using his Rt. hand, he is able to hold otherwise it is difficult for him to hold using his Rt. arm. He also complaints of numbness over lower lip and decreased sensation on his Rt. hand with difficulty articulating words since yesterday evening. He states he had these symptoms on saturday that lasted for 5hrs, he called his neurologist after the episode and was told that he might have stroke. He had stroke 1 year ago diagnosed with MRI. He follows up with his he is able to hold otherwise it is difficult for him to hold using his Rt. arm. He also complaints of numbness over lower lip and decreased sensation on his Rt. hand with difficulty articulating words since yesterday evening. He states he had these symptoms on saturday that lasted for 5hrs, he called his neurologist after the episode and was told that he might have stroke. He had stroke 1 year ago diagnosed with MRI. He follows up with his 69M from home, works as , PMHx a-fib (s/p ablation Aug 2019,  loop recorder(Medtronic)) on Eliquis, dyslipidemia, prostate cancer (RT on 2012) presented to ED c/o Difficulty in coordination of Rt. arm. He states since 5:30pm yesterday, he has difficulty coordinating his Rt. arm, if he concentrates, he is able to hold otherwise it is difficult for him to hold using his Rt. arm. He also complaints of numbness over lower lip and decreased sensation on his Rt. hand with difficulty articulating words since yesterday evening. He states he had these symptoms on saturday that lasted for 5hrs, he called his neurologist after the episode and was told that he might have stroke. He had stroke 1 year ago diagnosed with MRI. He follows up with his EP Dr. Bustamante, last visit was one week ago.

## 2019-11-05 NOTE — H&P ADULT - ASSESSMENT
69M from home, works as , PMHx a-fib (s/p ablation Aug 2019,  loop recorder(Medtronic)) on Eliquis, dyslipidemia, prostate cancer (RT on 2012) presented to ED c/o Difficulty in coordination of Rt. arm.

## 2019-11-05 NOTE — H&P ADULT - ATTENDING COMMENTS
Patient seen and examined; Agree with PGY1 A/P above with editing as needed. My independent assessment, findings on exam, diagnosis and plan of care as listed below    69M from home, PMHx of A. fib s/p ablation and loop recorder(medtronic) on Aug. 2019, HLD, prostate Ca s/p RT on 2016 presented to ED c/o difficulty in coordination of Rt. arm. He states since 5:30pm yesterday, he started getting difficulty coordinating with Rt. arm, if he concentrates and picks or holds onto something using his Rt. hand,     No difficulty swallowing; Eating dinner. Able to walk well without any gait imbalance    Vital Signs Last 24 Hrs  T(C): 36.9 (05 Nov 2019 15:46), Max: 36.9 (05 Nov 2019 15:46)  T(F): 98.5 (05 Nov 2019 15:46), Max: 98.5 (05 Nov 2019 15:46)  HR: 69 (05 Nov 2019 15:46) (69 - 85)  BP: 132/86 (05 Nov 2019 15:46) (132/86 - 136/85)  RR: 18 (05 Nov 2019 15:46) (18 - 20)  SpO2: 99% (05 Nov 2019 15:46) (96% - 99%)    P/E:  Elderly male, comfortable at rest   Gait: Balanced  Neuro: AAO x3; No gross focal deficit except subjective right arm tingling and numbness  CVS: S1S2 present  resp: BLAE+, No wheeze or Rhonchi  GI: Soft, BS=, NT  Extr: No edema or calf tenderness    Labs:                        15.0   5.44  )-----------( 201      ( 05 Nov 2019 16:34 )             45.6   11-05    140  |  106  |  13  ----------------------------<  92  3.6   |  29  |  x     Ca    9.3      05 Nov 2019 16:34  Phos  2.9     11-05  Mg     2.2     11-05    TPro  x   /  Alb  4.1  /  TBili  x   /  DBili  x   /  AST  x   /  ALT  x   /  AlkPhos  x   11-05    < from: CT Angio Head w/ IV Cont (11.05.19 @ 10:49) >  EXAM:  CT ANGIO NECK (W)AW IC                        EXAM:  CT ANGIO BRAIN (W)AW IC                        PROCEDURE DATE:  11/05/2019      INTERPRETATION:  CT angiogram head and neck with and without contrast    History dizziness, left-sided facial numbness  Contrast Omnipaque 90 cc; 10 cc discarded  Multiplanar MIPS included    Precontrast CT of the brain is negative for hemorrhage, mass effect,  cortical edema or hydrocephalus.    Examination of the cervical circulation is negative for carotid or vertebral artery stenosis, occlusion or dissection.    Examination of the intracranial circulation is negative for significant stenosis or focal occlusion or aneurysm. The right posterior cerebral   artery is fed primarily from the anterior circulation.    IMPRESSION: No acute findings    D/D:  TIA vs CVA  Possible Cervical radiculopathy  Hx A. Fib s/p Ablation    Plan:  Tele; Echo;   Neuro eval as patient may need MRI  I will get a CT Neck without contrast to evaluate for Cervical spondylosis given patient is a   Cardio eval Dr. Escobar covering Dr. Jean  Continue Apixaban; patient on 5 mg twicwe daily wife confirmed over the phone    Discussed with patient findings and plan of care  Discussed with PGY2 Dr. Jenkins and RN    FULL NOTE TO FOLLOW Patient seen and examined; Agree with PGY1 A/P above with editing as needed. My independent assessment, findings on exam, diagnosis and plan of care as listed below    69M from home, PMHx of A. fib s/p ablation and loop recorder (Medtronic) on Aug. 2019, HLD, prostate Ca s/p RT  2016 now on Rapaflo presented to ED c/o difficulty in coordination of Rt. arm. He states since 5:30pm yesterday, he started getting difficulty coordinating with Rt. arm, if he concentrates and picks or holds onto something using his Rt. hand, He also feels tingling and numbness right arm. He is a  by profession.     No difficulty swallowing; Eating dinner. Able to walk well without any gait imbalance    ROS/FH/SH: As above; Non smoker; drinks may be once a month or so    Vital Signs Last 24 Hrs  T(C): 36.9 (05 Nov 2019 15:46), Max: 36.9 (05 Nov 2019 15:46)  T(F): 98.5 (05 Nov 2019 15:46), Max: 98.5 (05 Nov 2019 15:46)  HR: 69 (05 Nov 2019 15:46) (69 - 85)  BP: 132/86 (05 Nov 2019 15:46) (132/86 - 136/85)  RR: 18 (05 Nov 2019 15:46) (18 - 20)  SpO2: 99% (05 Nov 2019 15:46) (96% - 99%)    P/E:  Elderly male, comfortable at rest   Gait: Balanced  Neuro: AAO x3; No gross focal deficit except subjective right arm tingling and numbness  CVS: S1S2 present  resp: BLAE+, No wheeze or Rhonchi  GI: Soft, BS=, NT  Extr: No edema or calf tenderness    Labs:                        15.0   5.44  )-----------( 201      ( 05 Nov 2019 16:34 )             45.6   11-05    140  |  106  |  13  ----------------------------<  92  3.6   |  29  |  x     Ca    9.3      05 Nov 2019 16:34  Phos  2.9     11-05  Mg     2.2     11-05    TPro  x   /  Alb  4.1  /  TBili  x   /  DBili  x   /  AST  x   /  ALT  x   /  AlkPhos  x   11-05    < from: CT Angio Head w/ IV Cont (11.05.19 @ 10:49) >  EXAM:  CT ANGIO NECK (W)AW IC                        EXAM:  CT ANGIO BRAIN (W)AW IC                        PROCEDURE DATE:  11/05/2019      INTERPRETATION:  CT angiogram head and neck with and without contrast    History dizziness, left-sided facial numbness  Contrast Omnipaque 90 cc; 10 cc discarded  Multiplanar MIPS included    Precontrast CT of the brain is negative for hemorrhage, mass effect,  cortical edema or hydrocephalus.  Examination of the cervical circulation is negative for carotid or vertebral artery stenosis, occlusion or dissection.  Examination of the intracranial circulation is negative for significant stenosis or focal occlusion or aneurysm. The right posterior cerebral   artery is fed primarily from the anterior circulation.    IMPRESSION: No acute findings    D/D:  TIA vs CVA  Possible Cervical radiculopathy  Hx A. Fib s/p Ablation    Plan:  Tele; Echo;   Neuro eval as patient may need MRI  I will get a CT Neck without contrast to evaluate for Cervical spondylosis given patient is a   Cardio eval Dr. Escobar  Continue Apixaban; patient on 5 mg twice daily wife confirmed over the phone  Continue rest of the home meds  Patient able to ambulate independently as well as no evidence of dysphagia noted with eating dinner this evening    Discussed with patient findings and plan of care  Discussed with PGY2 Dr. Jenkins and RN

## 2019-11-05 NOTE — H&P ADULT - NSICDXFAMILYHX_GEN_ALL_CORE_FT
FAMILY HISTORY:  Family history of pancreatic cancer  Family history of stomach cancer  No pertinent family history in first degree relatives

## 2019-11-05 NOTE — ED PROVIDER NOTE - PMH
Afib    Disease of biliary tract, unspecified    Dyslipidemia    Malignant neoplasm  h/o  Obesity    Prostate CA

## 2019-11-05 NOTE — ED ADULT NURSE NOTE - OBJECTIVE STATEMENT
pt came in for c/o weakness. pt endorses last saturday 11/3/2019 he developed weakness/numbness in his RUE for several hours which then resolved, following that on 11/4/19 @ 1730 PM pt developed RUE weakness again along with slurred speech, pt endorses he called his PMD, and was told by his PMD that "He mightve had a stroke". Pt presents to the ED today with numbness & pronator drift to RUE, otherwise no weakness in the lower extremities, slurred speech or facial drooping noted. pt ambulates with steady gait. Denies blurry vision, cp, sob. Breathing unlabored.

## 2019-11-05 NOTE — H&P ADULT - NSICDXPASTSURGICALHX_GEN_ALL_CORE_FT
PAST SURGICAL HISTORY:  H/O prostate cancer 2012 - seed implant    H/O vitrectomy right 2015 for retinal detachment    History of appendectomy     No significant past surgical history     S/P LASIK surgery 1999

## 2019-11-05 NOTE — H&P ADULT - PROBLEM SELECTOR PLAN 1
p/w Difficulty in coordination of Rt. arm  - NIHSS 2  - CTA head and neck negative for infarct or bleed  - EKG shows NSR@75bpm  - did not receive TPA as out of window  - Started ASA 81mg and Lipitor 40mg HS   - telemetry monitoring  - FU TTE  - FU HbA1C and fasting lipid profile  - allow permissive HTN   - Frequent neurochecks, fall precautions, aspiration precautions  - normal diet as passed bedside swallowing test  - PT evaluation not needed as pt. ambulatory  - Cardiology eval- Dr. Osman  - Neurology eval- Dr. Montaño  - FU CT cervical spine

## 2019-11-05 NOTE — PATIENT PROFILE ADULT - PRIMARY SOURCE OF SUPPORT/COMFORT
Group Topic: BH Recovery Skills    Date: August 16  Start Time:  1:00 PM  End Time:  2:00 PM    Focus: codependency  Number in attendance: 16    Participation: Active  Patient Response: Attentive   spouse

## 2019-11-05 NOTE — H&P ADULT - PROBLEM SELECTOR PLAN 4
IMPROVE VTE Individual Risk Assessment  RISK                                                                Points  [  ] Previous VTE                                                  3  [  ] Thrombophilia                                               2  [  ] Lower limb paralysis                                      2        (unable to hold up >15 seconds)    [  ] Current Cancer                                              2         (within 6 months)  [x  ] Immobilization > 24 hrs                                1  [  ] ICU/CCU stay > 24 hours                              1  [x  ] Age > 60                                                      1  IMPROVE VTE Score _________2,   on eleiquis

## 2019-11-05 NOTE — ED PROVIDER NOTE - OBJECTIVE STATEMENT
70 y/o M patient with a significant PMHx of a-fib ablation done, on Eliquis, dyslipidemia, prostate cancer presents to the ED for stroke like symptoms that started x3 days. Patient states on Saturday, patient couldn't move right arm, experienced right face numbness, and  trouble articulating speech that lasted x5 hours. Patient states symptoms resolved and he called a neurologist who told him that he probably had a stroke. Patient had another episode of symptoms yesterday at 5:30pm. Patient states that he has no control of right arm, coordination is off, and pins and needle on his fingertips. Patient denies any headache, visual changes, lower leg weakness or any other complaints. NKDA. 70 y/o M patient with a significant PMHx of a-fib ablation done, on Eliquis and metoprolol, dyslipidemia, prostate cancer presents to the ED for stroke like symptoms that started x3 days. Patient states on Saturday, patient couldn't move right arm, experienced right face numbness, and  trouble articulating speech that lasted x5 hours. Patient states symptoms resolved and he called a neurologist who told him that he probably had a stroke. Patient had another episode of symptoms yesterday at 5:30pm. Patient states that he has no control of right arm, coordination is off, and pins and needle on his fingertips. Patient denies any headache, visual changes, lower leg weakness or any other complaints. NKDA.

## 2019-11-05 NOTE — H&P ADULT - PROBLEM SELECTOR PLAN 2
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

## 2019-11-05 NOTE — ED PROVIDER NOTE - NIH STROKE SCALE: 5B. MOTOR ARM, RIGHT, QM
(0) No drift; limb holds 90 (or 45) degrees for full 10 secs (1) Drift; limb holds 90 (or 45) degrees, but drifts down before full 10 secs; does not hit bed or other support

## 2019-11-05 NOTE — ED PROVIDER NOTE - PROGRESS NOTE DETAILS
Vázquez: no worsening neuro sx. cta neg.  asa given  admit to med [facility] for tia/cva sx.  dysphagia pass.

## 2019-11-05 NOTE — ED PROVIDER NOTE - CLINICAL SUMMARY MEDICAL DECISION MAKING FREE TEXT BOX
CVA possible embolic due to rish factor of a-fib. EKG showed normal sinus rhythm. Will do labs, CTA head, give aspirin and admit.

## 2019-11-05 NOTE — H&P ADULT - NSHPREVIEWOFSYSTEMS_GEN_ALL_CORE
CONSTITUTIONAL: No fever,   EYES: no acute visual disturbances  NECK: No pain or stiffness  RESPIRATORY: No cough; No shortness of breath  CARDIOVASCULAR: No chest pain, no palpitations  GASTROINTESTINAL: No pain. No nausea or vomiting; No diarrhea   NEUROLOGICAL: No headache or numbness, no tremors  MUSCULOSKELETAL: No joint pain, no muscle pain  GENITOURINARY: no dysuria, no frequency, no hesitancy  PSYCHIATRY: no depression , no anxiety  ALL OTHER  ROS negative

## 2019-11-06 DIAGNOSIS — Z29.9 ENCOUNTER FOR PROPHYLACTIC MEASURES, UNSPECIFIED: ICD-10-CM

## 2019-11-06 DIAGNOSIS — E78.5 HYPERLIPIDEMIA, UNSPECIFIED: ICD-10-CM

## 2019-11-06 DIAGNOSIS — I63.9 CEREBRAL INFARCTION, UNSPECIFIED: ICD-10-CM

## 2019-11-06 DIAGNOSIS — I48.91 UNSPECIFIED ATRIAL FIBRILLATION: ICD-10-CM

## 2019-11-06 LAB
ALBUMIN SERPL ELPH-MCNC: 3.6 G/DL — SIGNIFICANT CHANGE UP (ref 3.5–5)
ALP SERPL-CCNC: 49 U/L — SIGNIFICANT CHANGE UP (ref 40–120)
ALT FLD-CCNC: 17 U/L DA — SIGNIFICANT CHANGE UP (ref 10–60)
ANION GAP SERPL CALC-SCNC: 7 MMOL/L — SIGNIFICANT CHANGE UP (ref 5–17)
AST SERPL-CCNC: 9 U/L — LOW (ref 10–40)
BASOPHILS # BLD AUTO: 0.02 K/UL — SIGNIFICANT CHANGE UP (ref 0–0.2)
BASOPHILS NFR BLD AUTO: 0.4 % — SIGNIFICANT CHANGE UP (ref 0–2)
BILIRUB SERPL-MCNC: 0.8 MG/DL — SIGNIFICANT CHANGE UP (ref 0.2–1.2)
BUN SERPL-MCNC: 16 MG/DL — SIGNIFICANT CHANGE UP (ref 7–18)
CALCIUM SERPL-MCNC: 9.3 MG/DL — SIGNIFICANT CHANGE UP (ref 8.4–10.5)
CHLORIDE SERPL-SCNC: 104 MMOL/L — SIGNIFICANT CHANGE UP (ref 96–108)
CHOLEST SERPL-MCNC: 165 MG/DL — SIGNIFICANT CHANGE UP (ref 10–199)
CO2 SERPL-SCNC: 28 MMOL/L — SIGNIFICANT CHANGE UP (ref 22–31)
CREAT SERPL-MCNC: 0.99 MG/DL — SIGNIFICANT CHANGE UP (ref 0.5–1.3)
EOSINOPHIL # BLD AUTO: 0.07 K/UL — SIGNIFICANT CHANGE UP (ref 0–0.5)
EOSINOPHIL NFR BLD AUTO: 1.3 % — SIGNIFICANT CHANGE UP (ref 0–6)
GLUCOSE SERPL-MCNC: 106 MG/DL — HIGH (ref 70–99)
HBA1C BLD-MCNC: 5.6 % — SIGNIFICANT CHANGE UP (ref 4–5.6)
HBA1C BLD-MCNC: 5.7 % — HIGH (ref 4–5.6)
HCT VFR BLD CALC: 43.5 % — SIGNIFICANT CHANGE UP (ref 39–50)
HCV AB S/CO SERPL IA: 0.07 S/CO — SIGNIFICANT CHANGE UP (ref 0–0.99)
HCV AB SERPL-IMP: SIGNIFICANT CHANGE UP
HDLC SERPL-MCNC: 69 MG/DL — SIGNIFICANT CHANGE UP
HGB BLD-MCNC: 14.3 G/DL — SIGNIFICANT CHANGE UP (ref 13–17)
IMM GRANULOCYTES NFR BLD AUTO: 0.2 % — SIGNIFICANT CHANGE UP (ref 0–1.5)
LIPID PNL WITH DIRECT LDL SERPL: 81 MG/DL — SIGNIFICANT CHANGE UP
LYMPHOCYTES # BLD AUTO: 1.53 K/UL — SIGNIFICANT CHANGE UP (ref 1–3.3)
LYMPHOCYTES # BLD AUTO: 28.7 % — SIGNIFICANT CHANGE UP (ref 13–44)
MAGNESIUM SERPL-MCNC: 2.1 MG/DL — SIGNIFICANT CHANGE UP (ref 1.6–2.6)
MCHC RBC-ENTMCNC: 31.1 PG — SIGNIFICANT CHANGE UP (ref 27–34)
MCHC RBC-ENTMCNC: 32.9 GM/DL — SIGNIFICANT CHANGE UP (ref 32–36)
MCV RBC AUTO: 94.6 FL — SIGNIFICANT CHANGE UP (ref 80–100)
MONOCYTES # BLD AUTO: 0.46 K/UL — SIGNIFICANT CHANGE UP (ref 0–0.9)
MONOCYTES NFR BLD AUTO: 8.6 % — SIGNIFICANT CHANGE UP (ref 2–14)
NEUTROPHILS # BLD AUTO: 3.24 K/UL — SIGNIFICANT CHANGE UP (ref 1.8–7.4)
NEUTROPHILS NFR BLD AUTO: 60.8 % — SIGNIFICANT CHANGE UP (ref 43–77)
NRBC # BLD: 0 /100 WBCS — SIGNIFICANT CHANGE UP (ref 0–0)
PHOSPHATE SERPL-MCNC: 3.3 MG/DL — SIGNIFICANT CHANGE UP (ref 2.5–4.5)
PLATELET # BLD AUTO: 182 K/UL — SIGNIFICANT CHANGE UP (ref 150–400)
POTASSIUM SERPL-MCNC: 3.9 MMOL/L — SIGNIFICANT CHANGE UP (ref 3.5–5.3)
POTASSIUM SERPL-SCNC: 3.9 MMOL/L — SIGNIFICANT CHANGE UP (ref 3.5–5.3)
PROT SERPL-MCNC: 6.6 G/DL — SIGNIFICANT CHANGE UP (ref 6–8.3)
RBC # BLD: 4.6 M/UL — SIGNIFICANT CHANGE UP (ref 4.2–5.8)
RBC # FLD: 13.2 % — SIGNIFICANT CHANGE UP (ref 10.3–14.5)
SODIUM SERPL-SCNC: 139 MMOL/L — SIGNIFICANT CHANGE UP (ref 135–145)
TOTAL CHOLESTEROL/HDL RATIO MEASUREMENT: 2.4 RATIO — LOW (ref 3.4–9.6)
TRIGL SERPL-MCNC: 76 MG/DL — SIGNIFICANT CHANGE UP (ref 10–149)
TSH SERPL-MCNC: 1.07 UU/ML — SIGNIFICANT CHANGE UP (ref 0.34–4.82)
VIT B12 SERPL-MCNC: 655 PG/ML — SIGNIFICANT CHANGE UP (ref 232–1245)
VIT B12 SERPL-MCNC: 768 PG/ML — SIGNIFICANT CHANGE UP (ref 232–1245)
WBC # BLD: 5.33 K/UL — SIGNIFICANT CHANGE UP (ref 3.8–10.5)
WBC # FLD AUTO: 5.33 K/UL — SIGNIFICANT CHANGE UP (ref 3.8–10.5)

## 2019-11-06 PROCEDURE — 72141 MRI NECK SPINE W/O DYE: CPT | Mod: 26

## 2019-11-06 PROCEDURE — 99233 SBSQ HOSP IP/OBS HIGH 50: CPT | Mod: GC

## 2019-11-06 PROCEDURE — 99254 IP/OBS CNSLTJ NEW/EST MOD 60: CPT

## 2019-11-06 PROCEDURE — 72125 CT NECK SPINE W/O DYE: CPT | Mod: 26

## 2019-11-06 RX ADMIN — APIXABAN 5 MILLIGRAM(S): 2.5 TABLET, FILM COATED ORAL at 17:05

## 2019-11-06 RX ADMIN — TAMSULOSIN HYDROCHLORIDE 0.4 MILLIGRAM(S): 0.4 CAPSULE ORAL at 22:50

## 2019-11-06 RX ADMIN — APIXABAN 5 MILLIGRAM(S): 2.5 TABLET, FILM COATED ORAL at 05:56

## 2019-11-06 RX ADMIN — ATORVASTATIN CALCIUM 40 MILLIGRAM(S): 80 TABLET, FILM COATED ORAL at 22:50

## 2019-11-06 RX ADMIN — Medication 1 MILLIGRAM(S): at 11:24

## 2019-11-06 NOTE — PROGRESS NOTE ADULT - PROBLEM SELECTOR PLAN 1
p/w Difficulty in coordination of Rt. arm  - NIHSS 2 on admission  - CTA head and neck negative for infarct or bleed  - EKG shows NSR@75bpm  - did not receive TPA as out of window  - Started ASA 81mg and Lipitor 40mg HS   - telemetry monitoring  -  TTE >55  -  HbA1C 5.7 and fasting lipid profile wnl  - allow permissive HTN   - Frequent neurochecks, fall precautions, aspiration precautions  - normal diet as passed bedside swallowing test  - PT evaluation not needed as pt. ambulatory  - Cardiology eval- Dr. Osman  - Neurology eval- Dr. Montaño  -  CT cervical spine mild multilevel cervical spondylosis  -as per neuro consult =MRI Cervical spine no contrast ordered  -suspected cervical spondylomylopathy as per neuro

## 2019-11-06 NOTE — CONSULT NOTE ADULT - SUBJECTIVE AND OBJECTIVE BOX
HISTORY FROM ADMISSION H&P       [Start of quoted text]  History of Present Illness:  Reason for Admission: Difficulty in coordination of Rt. 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 arm. He states since 5:30pm yesterday, he has difficulty coordinating his Rt. arm, if he concentrates, he is able to hold otherwise it is difficult for him to hold using his Rt. arm. He also complaints of numbness over lower lip and decreased sensation on his Rt. hand with difficulty articulating words since yesterday evening. He states he had these symptoms on saturday that lasted for 5hrs, he called his neurologist after the episode and was told that he might have stroke. He had stroke 1 year ago diagnosed with MRI. He follows up with his EP Dr. Bustamante, last visit was one week ago.      Review of Systems:  Review of Systems: CONSTITUTIONAL: No fever,   EYES: no acute visual disturbances  NECK: No pain or stiffness  RESPIRATORY: No cough; No shortness of breath  CARDIOVASCULAR: No chest pain, no palpitations  GASTROINTESTINAL: No pain. No nausea or vomiting; No diarrhea   NEUROLOGICAL: No headache or numbness, no tremors  MUSCULOSKELETAL: No joint pain, no muscle pain  GENITOURINARY: no dysuria, no frequency, no hesitancy  PSYCHIATRY: no depression , no anxiety ALL OTHER  ROS negative	      Allergies and Intolerances:        Allergies:  	No Known Allergies:     Home Medications:   * Patient Currently Takes Medications as of 20-Dec-2018 11:15 documented in Structured Notes  · 	apixaban 5 mg oral tablet: 1 tab(s) orally 2 times a day   · 	atorvastatin 40 mg oral tablet: 1 tab(s) orally once a day (at bedtime)  · 	metoprolol tartrate 25 mg oral tablet: 1 tab(s) orally 2 times a day  · 	metoprolol tartrate 25 mg oral tablet: Last Dose Taken:  , 1 tab(s) orally once a day  · 	apixaban 2.5 mg oral tablet: Last Dose Taken:  , 1 tab(s) orally 2 times a day  · 	Rapaflo 8 mg oral capsule: 1 cap(s) orally once a day in pm  · 	folic acid 1 mg oral tablet: 1 tab(s) orally once a day  · 	aspirin 81 mg oral tablet, chewable: 1 tab(s) orally once a day  · 	Flomax 0.4 mg oral capsule: 1 cap(s) orally once a day    Patient History:    Past Medical, Past Surgical, and Family History:  PAST MEDICAL HISTORY:  Afib     Disease of biliary tract, unspecified     Dyslipidemia     Malignant neoplasm h/o    Obesity     Prostate CA.     PAST SURGICAL HISTORY:  H/O prostate cancer 2012 - seed implant    H/O vitrectomy right 2015 for retinal detachment    History of appendectomy     No significant past surgical history     S/P LASIK surgery 1999.     FAMILY HISTORY:  Family history of pancreatic cancer  Family history of stomach cancer  No pertinent family history in first degree relatives.   [End of quoted text from Admission H&P]      SELECTED TEST RESULTS    Unremarkable head CT/brain and neck CTA.      Mild multi-level degen changes on c-spine CT.      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  The next time he noted a motor or sensory problem was on Monday 11/4/2019 at 5:30 PM when he felt numbness (decreased sensation) in digits 3-5 of the 5 hand and had trouble with using the R hand (e.g., grasping a teacup).   He still perceives RUE motor impairment but ?no longer a sensory abnormality.  He also noted troble speaking (enunciation) and a numb sensation about the R corner of his mouth for around 5 mins when the motor symptoms started on 11/4.              Native language [place]; very good fluency in English, accented, with minor errors.      EXAMINATION       Medium to medium heavy muscular build.  Alert; pleasant; good sense of humor.      PERRL; EOMI.  Partial cut of upper nasal field OD on confrontation testing (Pt relates he has residuum from retinal detachment).  Normal facial, lingual, palatal movements.        Slight RUE drift.  Slower Genet R hand than L (below average even on L).      All major muscle groups RUE weak compared w LUE, except L deltoid weaker than R.  Even on the L I can "break" the triceps without great effort when he locks the elbow in extension (indicating absolute weakness).      All major muscle groups LLE weaker than in RUE except hamstrings weaker on R (hip extensors not fully evaluated).  He can situp without UE assist from a chair but needs to lean forward about 30 degrees (indicating hip ext and/or quads weak).      Pin/touch sensation intact face, neck, UEs, LEs.        Reflex                           Right    Left   Comment    Scapulohumeral                0        0  Pectoralis                         0        0  Biceps                              1       1  Triceps                              1       1  Brachiorad                         0       0  Fing flex                            0       0  Escoto                         absent  present  Patellar                             3       3+  ipsilat hip add spread on R; croseed add response f L  Gastroc                            0        2-  Plantar                        extensor extensor      DISCUSSION    The exam findings are indicative of cervical myelopathy due to extrinsic compression.  Statistically the most likely etiology at this age is cervical spondylosis.       Juxta-oral numb sensation would be an unusual symptom from cervical myelopathy but could happen (as the descending tract of IV goes down to upper cervical cord levels).  More confounding is the reported speech disturbance.        RECOMMENDATIONS    C-spine MRI (non-contrast).      ESR, CRP, RF, SPEP, B12 and folate levels, syphilis serology.

## 2019-11-06 NOTE — CONSULT NOTE ADULT - SUBJECTIVE AND OBJECTIVE BOX
CHIEF COMPLAINT: Right arm coordination    HPI: 70 yo M  with HLD,  A fib s/p ablation and ILR, on Eliquis,     EP: Dr. Bustamante    PAST MEDICAL & SURGICAL HISTORY:  As above, also Prostate CA s/p XRT, Obesity,   H/O vitrectomy: right 2015 for retinal detachment, History of appendectomy    Allergies    No Known Allergies    MEDICATIONS  (STANDING):  apixaban 5 milliGRAM(s) Oral every 12 hours  atorvastatin 40 milliGRAM(s) Oral at bedtime  folic acid 1 milliGRAM(s) Oral daily  tamsulosin 0.4 milliGRAM(s) Oral at bedtime    MEDICATIONS  (PRN):      FAMILY HISTORY:  No pertinent family history in first degree relatives  Family history of stomach cancer  Family history of pancreatic cancer    No family history of premature coronary artery disease or sudden cardiac death    SOCIAL HISTORY:  Smoking-  Alcohol-  Illicit Drug use-    REVIEW OF SYSTEMS:  Constitutional: [ ] fever, [ ]weight loss,  [ ]fatigue  Eyes: [ ] visual changes  Respiratory: [ ]shortness of breath;  [ ] cough, [ ]wheezing, [ ]chills, [ ]hemoptysis  Cardiovascular: [ ] chest pain, [ ]palpitations, [ ]dizziness,  [ ]leg swelling [ ]syncope  Gastrointestinal: [ ] abdominal pain, [ ]nausea, [ ]vomiting,  [ ]diarrhea   Genitourinary: [ ] dysuria, [ ] hematuria  Neurologic: [ ] headaches [ ] tremors  [ ] weakness [ ] lightheadedness  Skin: [ ] itching, [ ]burning, [ ] rashes  Endocrine: [ ] heat or cold intolerance  Musculoskeletal: [ ] joint pain or swelling; [ ] muscle, back, or extremity pain  Psychiatric: [ ] depression, [ ]anxiety, [ ]mood swings, or [ ]difficulty sleeping  Hematologic: [ ] easy bruising, [ ] bleeding gums       [ x] All others negative	  [ ] Unable to obtain    Vital Signs Last 24 Hrs  T(C): 36.8 (06 Nov 2019 05:30), Max: 37.1 (05 Nov 2019 20:36)  T(F): 98.3 (06 Nov 2019 05:30), Max: 98.8 (05 Nov 2019 20:36)  HR: 75 (06 Nov 2019 05:30) (68 - 75)  BP: 105/68 (06 Nov 2019 05:30) (91/57 - 132/86)  BP(mean): --  RR: 18 (06 Nov 2019 05:30) (18 - 19)  SpO2: 95% (06 Nov 2019 05:30) (95% - 99%)  I&O's Summary      PHYSICAL EXAM:  General: No acute distress  HEENT: EOMI, PERRL  Neck: Supple, No JVD  Lungs: Clear to auscultation bilaterally; No rales or wheezing  Heart: Regular rate and rhythm; No murmurs, rubs, or gallops  Abdomen: Nontender, bowel sounds present  Extremities: No clubbing, cyanosis, or edema  Nervous system:  Alert & Oriented X3, no focal deficits  Psychiatric: Normal affect  Skin: No rashes or lesions      LABS:  11-06    139  |  104  |  16  ----------------------------<  106<H>  3.9   |  28  |  0.99    Ca    9.3      06 Nov 2019 06:59  Phos  3.3     11-06  Mg     2.1     11-06    TPro  6.6  /  Alb  3.6  /  TBili  0.8  /  DBili  x   /  AST  9<L>  /  ALT  17  /  AlkPhos  49  11-06    Creatinine Trend: 0.99<--, 1.02<--, 1.05<--                        14.3   5.33  )-----------( 182      ( 06 Nov 2019 06:59 )             43.5         Lipid Panel: Cholesterol, Serum 165  Direct LDL 81  HDL Cholesterol, Serum 69  Triglycerides, Serum 76  Cholesterol, Serum 173  Direct LDL 78  HDL Cholesterol, Serum 83  Triglycerides, Serum 59    Cardiac Enzymes: CARDIAC MARKERS ( 05 Nov 2019 08:47 )  <0.015 ng/mL / x     / x     / x     / x            11-06 [id number] 5.7  11-06 LtsdhalatuV1H 5.6      RADIOLOGY:    ECG [my interpretation]:    TELEMETRY:    ECHO:    STRESS TEST:    CATHETERIZATION: CHIEF COMPLAINT: Right arm difficulty using    HPI: 68 yo M  with HLD, CVA (2018), A fib s/p ablation and ILR (08/2019), on Eliquis, who presented with difficulty using his right arm. Patient     EP: Dr. Leal    PAST MEDICAL & SURGICAL HISTORY:  As above, also Prostate CA s/p XRT (2016), Obesity,   H/O vitrectomy: right 2015 for retinal detachment, History of appendectomy    Allergies    No Known Allergies    MEDICATIONS  (STANDING):  apixaban 5 milliGRAM(s) Oral every 12 hours  atorvastatin 40 milliGRAM(s) Oral at bedtime  folic acid 1 milliGRAM(s) Oral daily  tamsulosin 0.4 milliGRAM(s) Oral at bedtime    MEDICATIONS  (PRN):      FAMILY HISTORY:  No pertinent family history in first degree relatives  Family history of stomach cancer  Family history of pancreatic cancer    No family history of premature coronary artery disease or sudden cardiac death    SOCIAL HISTORY:  Smoking-Denies  Alcohol-  Illicit Drug use-    REVIEW OF SYSTEMS:  Constitutional: [ ] fever, [ ]weight loss,  [ ]fatigue  Eyes: [ ] visual changes  Respiratory: [ ]shortness of breath;  [ ] cough, [ ]wheezing, [ ]chills, [ ]hemoptysis  Cardiovascular: [ ] chest pain, [ ]palpitations, [ ]dizziness,  [ ]leg swelling [ ]syncope  Gastrointestinal: [ ] abdominal pain, [ ]nausea, [ ]vomiting,  [ ]diarrhea   Genitourinary: [ ] dysuria, [ ] hematuria  Neurologic: [ ] headaches [ ] tremors  [ ] weakness [ ] lightheadedness  Skin: [ ] itching, [ ]burning, [ ] rashes  Endocrine: [ ] heat or cold intolerance  Musculoskeletal: [ ] joint pain or swelling; [ ] muscle, back, or extremity pain  Psychiatric: [ ] depression, [ ]anxiety, [ ]mood swings, or [ ]difficulty sleeping  Hematologic: [ ] easy bruising, [ ] bleeding gums       [ x] All others negative	  [ ] Unable to obtain    Vital Signs Last 24 Hrs  T(C): 36.8 (06 Nov 2019 05:30), Max: 37.1 (05 Nov 2019 20:36)  T(F): 98.3 (06 Nov 2019 05:30), Max: 98.8 (05 Nov 2019 20:36)  HR: 75 (06 Nov 2019 05:30) (68 - 75)  BP: 105/68 (06 Nov 2019 05:30) (91/57 - 132/86)  BP(mean): --  RR: 18 (06 Nov 2019 05:30) (18 - 19)  SpO2: 95% (06 Nov 2019 05:30) (95% - 99%)  I&O's Summary      PHYSICAL EXAM:  General: No acute distress  HEENT: EOMI, PERRL  Neck: Supple, No JVD  Lungs: Clear to auscultation bilaterally; No rales or wheezing  Heart: Regular rate and rhythm; No murmurs, rubs, or gallops  Abdomen: Nontender, bowel sounds present  Extremities: No clubbing, cyanosis, or edema  Nervous system:  Alert & Oriented X3, no focal deficits  Psychiatric: Normal affect  Skin: No rashes or lesions      LABS:  11-06    139  |  104  |  16  ----------------------------<  106<H>  3.9   |  28  |  0.99    Ca    9.3      06 Nov 2019 06:59  Phos  3.3     11-06  Mg     2.1     11-06    TPro  6.6  /  Alb  3.6  /  TBili  0.8  /  DBili  x   /  AST  9<L>  /  ALT  17  /  AlkPhos  49  11-06    Creatinine Trend: 0.99<--, 1.02<--, 1.05<--                        14.3   5.33  )-----------( 182      ( 06 Nov 2019 06:59 )             43.5         Lipid Panel: Cholesterol, Serum 165  Direct LDL 81  HDL Cholesterol, Serum 69  Triglycerides, Serum 76  Cholesterol, Serum 173  Direct LDL 78  HDL Cholesterol, Serum 83  Triglycerides, Serum 59    Cardiac Enzymes: CARDIAC MARKERS ( 05 Nov 2019 08:47 )  <0.015 ng/mL / x     / x     / x     / x        11-06 HjnveitdnkW1G 5.7  11-06 SvwvwlknpsG8R 5.6    RADIOLOGY: < from: Xray Chest 1 View AP/PA (11.05.19 @ 09:23) >  IMPRESSION: Platelike atelectasis or small infiltrate in the left lung   base. Clinical evaluation and follow-up PA and lateral view is recommended    < end of copied text >    < from: CT Angio Head w/ IV Cont (11.05.19 @ 10:49) >  IMPRESSION:    No acute findings    < end of copied text >    ECG [my interpretation]: 11/5/2019 @ 08:06: Sinus rhythm, normal EKG    TELEMETRY:    ECHO: < from: Transthoracic Echocardiogram (11.06.19 @ 15:55) >  CONCLUSIONS:  1. Trace mitral regurgitation.  2.  Trace aortic regurgitation.  3. Mildly dilated left atrium.  LA volume index = 38 cc/m2.  4. Normal left ventricular internal dimensions and wall  thicknesses.  5. Endocardium not well visualized; grossly normal left  ventricular systolic function.  6. Normal right ventricular size and function.  7. Agitated saline injection revealed bubbles in the left  heart, consistent with patent foramen ovale or small atrial  septal defect.    < end of copied text > CHIEF COMPLAINT: Right arm difficulty using    HPI: 68 yo M  with HLD, CVA (2018), A fib s/p ablation and ILR (08/2019), on Eliquis, who presented with difficulty using his right arm. Patient reports symptoms started around 5:30PM on Monday when he came home from work. Patient tried to take a pen in his hand and noticed he had difficulty taking the pen and using it. Patient then went to sleep and on Tuesday his symptoms did not improve, prompting him to come in to the ER. Patient denies any chest pain, dyspnea, palpitations, lightheadedness, or syncope. Currently reports symptoms are slightly better compared to initially, not aware of any alleviating factors aside from time.     EP: Dr. Bustamante     PAST MEDICAL & SURGICAL HISTORY:  As above, also Prostate CA s/p XRT (2016), Obesity,   H/O vitrectomy: right 2015 for retinal detachment, History of appendectomy    Allergies    No Known Allergies    MEDICATIONS  (STANDING):  apixaban 5 milliGRAM(s) Oral every 12 hours  atorvastatin 40 milliGRAM(s) Oral at bedtime  folic acid 1 milliGRAM(s) Oral daily  tamsulosin 0.4 milliGRAM(s) Oral at bedtime    MEDICATIONS  (PRN):      FAMILY HISTORY:  No pertinent family history in first degree relatives  Family history of stomach cancer  Family history of pancreatic cancer    No family history of premature coronary artery disease or sudden cardiac death    SOCIAL HISTORY:  Smoking-Denies  Alcohol-Denies  Illicit Drug use-Denies    REVIEW OF SYSTEMS:  Constitutional: [ ] fever, [ ]weight loss,  [ ]fatigue  Eyes: [ ] visual changes  Respiratory: [ ]shortness of breath;  [ ] cough, [ ]wheezing, [ ]chills, [ ]hemoptysis  Cardiovascular: [ ] chest pain, [ ]palpitations, [ ]dizziness,  [ ]leg swelling [ ]syncope  Gastrointestinal: [ ] abdominal pain, [ ]nausea, [ ]vomiting,  [ ]diarrhea   Genitourinary: [ ] dysuria, [ ] hematuria  Neurologic: [ ] headaches [ ] tremors  [ ] weakness [ ] lightheadedness  Skin: [ ] itching, [ ]burning, [ ] rashes  Endocrine: [ ] heat or cold intolerance  Musculoskeletal: [ ] joint pain or swelling; [ ] muscle, back, or extremity pain  Psychiatric: [ ] depression, [ ]anxiety, [ ]mood swings, or [ ]difficulty sleeping  Hematologic: [ ] easy bruising, [ ] bleeding gums       [ x] All others negative	  [ ] Unable to obtain    Vital Signs Last 24 Hrs  T(C): 36.8 (06 Nov 2019 05:30), Max: 37.1 (05 Nov 2019 20:36)  T(F): 98.3 (06 Nov 2019 05:30), Max: 98.8 (05 Nov 2019 20:36)  HR: 75 (06 Nov 2019 05:30) (68 - 75)  BP: 105/68 (06 Nov 2019 05:30) (91/57 - 132/86)  BP(mean): --  RR: 18 (06 Nov 2019 05:30) (18 - 19)  SpO2: 95% (06 Nov 2019 05:30) (95% - 99%)  I&O's Summary      PHYSICAL EXAM:  General: No acute distress  HEENT: EOMI, PERRL  Neck: Supple, No JVD  Lungs: Clear to auscultation bilaterally; No rales or wheezing  Heart: Regular rate and rhythm; No murmurs, rubs, or gallops  Abdomen: Nontender, bowel sounds present  Extremities: No clubbing, cyanosis, or edema  Nervous system:  Alert & Oriented X3, no focal deficits  Psychiatric: Normal affect  Skin: No rashes or lesions      LABS:  11-06    139  |  104  |  16  ----------------------------<  106<H>  3.9   |  28  |  0.99    Ca    9.3      06 Nov 2019 06:59  Phos  3.3     11-06  Mg     2.1     11-06    TPro  6.6  /  Alb  3.6  /  TBili  0.8  /  DBili  x   /  AST  9<L>  /  ALT  17  /  AlkPhos  49  11-06    Creatinine Trend: 0.99<--, 1.02<--, 1.05<--                        14.3   5.33  )-----------( 182      ( 06 Nov 2019 06:59 )             43.5         Lipid Panel: Cholesterol, Serum 165  Direct LDL 81  HDL Cholesterol, Serum 69  Triglycerides, Serum 76  Cholesterol, Serum 173  Direct LDL 78  HDL Cholesterol, Serum 83  Triglycerides, Serum 59    Cardiac Enzymes: CARDIAC MARKERS ( 05 Nov 2019 08:47 )  <0.015 ng/mL / x     / x     / x     / x        11-06 SqjqkafjibK6W 5.7  11-06 UzocqnqzuxJ7K 5.6    RADIOLOGY: < from: Xray Chest 1 View AP/PA (11.05.19 @ 09:23) >  IMPRESSION: Platelike atelectasis or small infiltrate in the left lung   base. Clinical evaluation and follow-up PA and lateral view is recommended    < end of copied text >    < from: CT Angio Head w/ IV Cont (11.05.19 @ 10:49) >  IMPRESSION:    No acute findings    < end of copied text >    ECG [my interpretation]: 11/5/2019 @ 08:06: Sinus rhythm, normal EKG    TELEMETRY: Sinus rhythm, no events    ECHO: < from: Transthoracic Echocardiogram (11.06.19 @ 15:55) >  CONCLUSIONS:  1. Trace mitral regurgitation.  2.  Trace aortic regurgitation.  3. Mildly dilated left atrium.  LA volume index = 38 cc/m2.  4. Normal left ventricular internal dimensions and wall  thicknesses.  5. Endocardium not well visualized; grossly normal left  ventricular systolic function.  6. Normal right ventricular size and function.  7. Agitated saline injection revealed bubbles in the left  heart, consistent with patent foramen ovale or small atrial  septal defect.    < end of copied text >

## 2019-11-06 NOTE — CONSULT NOTE ADULT - ASSESSMENT
70 yo M  with HLD, CVA (2018), A fib s/p ablation and ILR (08/2019), on Eliquis, who presented with difficulty using his right arm.     1. Possible CVA:   -Neurology evaluation and possible MRI pending  -Aspirin, statin per neurology  -Patient has known AF s/p ablation and is already on Eliquis  -Interrogate loop recorder. If there is no evidence of AF, then a cardioembolic etiology for the current symptoms would be excluded    2. A fib:   -CHADS2-VASc score is 4 (HTN, age, CVA +2), consistent with ~4% annual risk of stroke if off systemic anticoagulation  -On Eliquis 5mg PO BID   -Currently in NSR    3. HLD: Atorvastatin     ***Note that this is a preliminary note and any recommendations should NOT be carried out until this note is finalized. *** 68 yo M  with HLD, CVA (2018), A fib s/p ablation and ILR (08/2019), on Eliquis, who presented with difficulty using his right arm.     1. Possible CVA:   -Neurology evaluation and possible MRI pending  -Aspirin, statin per neurology  -Patient has known AF s/p ablation and is already on Eliquis  -Interrogate loop recorder. If there is no evidence of AF, then a cardioembolic etiology for the current symptoms would be reasonably excluded, particularly as patient has been on Eliquis this whole time.     2. A fib:   -CHADS2-VASc score is 4 (HTN, age, CVA +2), consistent with ~4% annual risk of stroke if off systemic anticoagulation  -On Eliquis 5mg PO BID   -Currently in NSR    3. HLD: Atorvastatin

## 2019-11-06 NOTE — PROGRESS NOTE ADULT - ATTENDING COMMENTS
Patient seen and examined 11/6/19 in the morning with wife at bedside; Agree with PGY1 A/P above with editing as needed. My independent assessment, findings on exam, diagnosis and plan of care as listed below. Discussed with PGY1 Dr. Rocha and PGY2 Dr. Ramirez    69M from home, PMHx of A. fib s/p ablation and loop recorder (Medtronic) on Aug. 2019, HLD, prostate Ca s/p RT  2016 now on Rapaflo presented to ED c/o difficulty in coordination of Rt. arm.  He also feels tingling and numbness right arm. He is a  by profession. No CVA seen on CT Angio H&N.     Patient is doing well without new complaints except some residual tingling and numbness on the right arm and 3 fingers.     Vital Signs: reviewed as above    P/E:  Elderly male, comfortable at rest ambulate independently  Gait: Balanced  Neuro: AAO x3; No gross focal deficit except subjective right arm tingling and numbness  CVS: S1S2 present  resp: BLAE+, No wheeze or Rhonchi  GI: Soft, BS+, NT  Extr: No edema or calf tenderness    Labs:                        14.3   5.33  )-----------( 182      ( 06 Nov 2019 06:59 )             43.5   11-06    139  |  104  |  16  ----------------------------<  106<H>  3.9   |  28  |  0.99    Ca    9.3      06 Nov 2019 06:59  Phos  3.3     11-06  Mg     2.1     11-06    TPro  6.6  /  Alb  3.6  /  TBili  0.8  /  DBili  x   /  AST  9<L>  /  ALT  17  /  AlkPhos  49  11-06     CT Cervical Spine No Cont (11.06.19 @ 09:40)     FINDINGS: Noacute fractures or dislocations are notable. There is nonspecific straightening and very mild reversal of the cervical lordotic   curvature. There is very minimal retrolisthesis of C4 on C5 and C5 on C6. There is no loss of vertebral body height. Scattered degenerative   lucencies and areas of sclerosis are notable throughout the vertebral bodies and facets. There is degenerative disc disease at C4-C5 and at   C5-C6 and to a lesser degree at C6-C7 with reactive endplate changes. Multilevel facet arthrosis is notable. The dens is intact. The lateral   masses of C1 are not displaced. There is no prevertebral soft tissue swelling.  There are variable degrees of multilevel canal and foraminal stenosis secondary to endplate degenerative changes and facet arthrosis.  The imaged soft tissue structures of the neck and lung apices appear unremarkable.    IMPRESSION: Mild multilevel cervical spondylosis as discussed.    D/D:  Right hand tingling and numbness likely related Cervical Radiculopathy  Concern for TIA, no new CVA  Hx A. Fib s/p Ablation    Plan:  Tele; Echo;   Neuro eval as patient may need MRI  I will get a CT Neck without contrast to evaluate for Cervical spondylosis given patient is a   Cardio eval Dr. Escobar  Continue Apixaban; patient on 5 mg twice daily wife confirmed over the phone  Continue rest of the home meds  Patient able to ambulate independently as well as no evidence of dysphagia noted with eating dinner this evening    Discussed with patient findings and plan of care  Discussed with PGY2 Dr. Jenkins and RN. Patient seen and examined 11/6/19 in the morning with wife at bedside; Agree with PGY1 A/P above with editing as needed. My independent assessment, findings on exam, diagnosis and plan of care as listed below. Discussed with PGY1 Dr. Rocha and PGY2 Dr. Ramirez    69M from home, PMHx of A. fib s/p ablation and loop recorder (Medtronic) on Aug. 2019, HLD, prostate Ca s/p RT  2016 now on Rapaflo presented to ED c/o difficulty in coordination of Rt. arm.  He also feels tingling and numbness right arm. He is a  by profession. No CVA seen on CT Angio H&N.     Patient is doing well without new complaints except some residual tingling and numbness on the right arm and 3 fingers.     Vital Signs: reviewed as above    P/E:  Elderly male, comfortable at rest ambulate independently  Gait: Balanced  Neuro: AAO x3; No gross focal deficit except subjective right arm tingling and numbness  CVS: S1S2 present  resp: BLAE+, No wheeze or Rhonchi  GI: Soft, BS+, NT  Extr: No edema or calf tenderness    Labs:                        14.3   5.33  )-----------( 182      ( 06 Nov 2019 06:59 )             43.5   11-06    139  |  104  |  16  ----------------------------<  106<H>  3.9   |  28  |  0.99    Ca    9.3      06 Nov 2019 06:59  Phos  3.3     11-06  Mg     2.1     11-06    TPro  6.6  /  Alb  3.6  /  TBili  0.8  /  DBili  x   /  AST  9<L>  /  ALT  17  /  AlkPhos  49  11-06     CT Cervical Spine No Cont (11.06.19 @ 09:40)     FINDINGS: No acute fractures or dislocations are notable. There is nonspecific straightening and very mild reversal of the cervical lordotic   curvature. There is very minimal retrolisthesis of C4 on C5 and C5 on C6. There is no loss of vertebral body height. Scattered degenerative   lucencies and areas of sclerosis are notable throughout the vertebral bodies and facets. There is degenerative disc disease at C4-C5 and at   C5-C6 and to a lesser degree at C6-C7 with reactive endplate changes. Multilevel facet arthrosis is notable. The dens is intact. The lateral   masses of C1 are not displaced. There is no prevertebral soft tissue swelling.  There are variable degrees of multilevel canal and foraminal stenosis secondary to endplate degenerative changes and facet arthrosis.  The imaged soft tissue structures of the neck and lung apices appear unremarkable.    IMPRESSION: Mild multilevel cervical spondylosis as discussed.    Transthoracic Echocardiogram (11.06.19 @ 15:55)     CONCLUSIONS:  1. Trace mitral regurgitation.  2.  Trace aortic regurgitation.  3. Mildly dilated left atrium.  LA volume index = 38 cc/m2.  4. Normal left ventricular internal dimensions and wall thicknesses.  5. Endocardium not well visualized; grossly normal left ventricular systolic function.  6. Normal right ventricular size and function.  7. Agitated saline injection revealed bubbles in the left heart, consistent with patent foramen ovale or small atrial septal defect.    D/D:  Right hand tingling and numbness likely related Cervical Radiculopathy  Concern for TIA, no new CVA  Hx A. Fib s/p Ablation    Plan:  CT findings noted; evidence of Cervical spondylosis  Neuro eval appreciated; Discussed with Dr. Lainez. Follow up MRI  Discussed with MRI Tech;   Cardio eval appreciated; d/w Dr. Escobar  Continue Apixaban 5 mg twice daily  Continue rest of the home meds  Patient able to ambulate independently as well as no evidence of dysphagia noted with eating dinner this evening  D/C Home tomorrow if stable    Discussed with patient and wife at bedside findings and plan of care  Discussed with PGY1 Dr. Rocha and PGY2 Dr. Ramirez and RN.

## 2019-11-07 ENCOUNTER — TRANSCRIPTION ENCOUNTER (OUTPATIENT)
Age: 69
End: 2019-11-07

## 2019-11-07 VITALS
TEMPERATURE: 99 F | SYSTOLIC BLOOD PRESSURE: 111 MMHG | HEART RATE: 81 BPM | DIASTOLIC BLOOD PRESSURE: 71 MMHG | RESPIRATION RATE: 18 BRPM | OXYGEN SATURATION: 97 %

## 2019-11-07 LAB
% ALBUMIN: 62.7 % — SIGNIFICANT CHANGE UP
% ALPHA 1: 3.7 % — SIGNIFICANT CHANGE UP
% ALPHA 2: 11.5 % — SIGNIFICANT CHANGE UP
% BETA: 11.2 % — SIGNIFICANT CHANGE UP
% GAMMA: 10.9 % — SIGNIFICANT CHANGE UP
ALBUMIN SERPL ELPH-MCNC: 3.3 G/DL — LOW (ref 3.5–5)
ALBUMIN SERPL ELPH-MCNC: 4 G/DL — SIGNIFICANT CHANGE UP (ref 3.6–5.5)
ALBUMIN/GLOB SERPL ELPH: 1.7 RATIO — SIGNIFICANT CHANGE UP
ALP SERPL-CCNC: 44 U/L — SIGNIFICANT CHANGE UP (ref 40–120)
ALPHA1 GLOB SERPL ELPH-MCNC: 0.2 G/DL — SIGNIFICANT CHANGE UP (ref 0.1–0.4)
ALPHA2 GLOB SERPL ELPH-MCNC: 0.7 G/DL — SIGNIFICANT CHANGE UP (ref 0.5–1)
ALT FLD-CCNC: 15 U/L DA — SIGNIFICANT CHANGE UP (ref 10–60)
ANION GAP SERPL CALC-SCNC: 6 MMOL/L — SIGNIFICANT CHANGE UP (ref 5–17)
AST SERPL-CCNC: 11 U/L — SIGNIFICANT CHANGE UP (ref 10–40)
B-GLOBULIN SERPL ELPH-MCNC: 0.7 G/DL — SIGNIFICANT CHANGE UP (ref 0.5–1)
BASOPHILS # BLD AUTO: 0.02 K/UL — SIGNIFICANT CHANGE UP (ref 0–0.2)
BASOPHILS NFR BLD AUTO: 0.4 % — SIGNIFICANT CHANGE UP (ref 0–2)
BILIRUB SERPL-MCNC: 0.7 MG/DL — SIGNIFICANT CHANGE UP (ref 0.2–1.2)
BUN SERPL-MCNC: 17 MG/DL — SIGNIFICANT CHANGE UP (ref 7–18)
CALCIUM SERPL-MCNC: 9.3 MG/DL — SIGNIFICANT CHANGE UP (ref 8.4–10.5)
CHLORIDE SERPL-SCNC: 105 MMOL/L — SIGNIFICANT CHANGE UP (ref 96–108)
CO2 SERPL-SCNC: 29 MMOL/L — SIGNIFICANT CHANGE UP (ref 22–31)
CREAT SERPL-MCNC: 0.92 MG/DL — SIGNIFICANT CHANGE UP (ref 0.5–1.3)
CRP SERPL-MCNC: <0.1 MG/DL — SIGNIFICANT CHANGE UP (ref 0–0.4)
EOSINOPHIL # BLD AUTO: 0.12 K/UL — SIGNIFICANT CHANGE UP (ref 0–0.5)
EOSINOPHIL NFR BLD AUTO: 2.3 % — SIGNIFICANT CHANGE UP (ref 0–6)
ERYTHROCYTE [SEDIMENTATION RATE] IN BLOOD: 7 MM/HR — SIGNIFICANT CHANGE UP (ref 0–20)
FOLATE SERPL-MCNC: >20 NG/ML — SIGNIFICANT CHANGE UP
GAMMA GLOBULIN: 0.7 G/DL — SIGNIFICANT CHANGE UP (ref 0.6–1.6)
GLUCOSE SERPL-MCNC: 108 MG/DL — HIGH (ref 70–99)
HCT VFR BLD CALC: 41.9 % — SIGNIFICANT CHANGE UP (ref 39–50)
HGB BLD-MCNC: 13.7 G/DL — SIGNIFICANT CHANGE UP (ref 13–17)
IMM GRANULOCYTES NFR BLD AUTO: 0.2 % — SIGNIFICANT CHANGE UP (ref 0–1.5)
INTERPRETATION SERPL IFE-IMP: SIGNIFICANT CHANGE UP
LYMPHOCYTES # BLD AUTO: 1.75 K/UL — SIGNIFICANT CHANGE UP (ref 1–3.3)
LYMPHOCYTES # BLD AUTO: 33.7 % — SIGNIFICANT CHANGE UP (ref 13–44)
MAGNESIUM SERPL-MCNC: 2.1 MG/DL — SIGNIFICANT CHANGE UP (ref 1.6–2.6)
MCHC RBC-ENTMCNC: 31 PG — SIGNIFICANT CHANGE UP (ref 27–34)
MCHC RBC-ENTMCNC: 32.7 GM/DL — SIGNIFICANT CHANGE UP (ref 32–36)
MCV RBC AUTO: 94.8 FL — SIGNIFICANT CHANGE UP (ref 80–100)
MONOCYTES # BLD AUTO: 0.51 K/UL — SIGNIFICANT CHANGE UP (ref 0–0.9)
MONOCYTES NFR BLD AUTO: 9.8 % — SIGNIFICANT CHANGE UP (ref 2–14)
NEUTROPHILS # BLD AUTO: 2.79 K/UL — SIGNIFICANT CHANGE UP (ref 1.8–7.4)
NEUTROPHILS NFR BLD AUTO: 53.6 % — SIGNIFICANT CHANGE UP (ref 43–77)
NRBC # BLD: 0 /100 WBCS — SIGNIFICANT CHANGE UP (ref 0–0)
PHOSPHATE SERPL-MCNC: 3.1 MG/DL — SIGNIFICANT CHANGE UP (ref 2.5–4.5)
PLATELET # BLD AUTO: 167 K/UL — SIGNIFICANT CHANGE UP (ref 150–400)
POTASSIUM SERPL-MCNC: 3.7 MMOL/L — SIGNIFICANT CHANGE UP (ref 3.5–5.3)
POTASSIUM SERPL-SCNC: 3.7 MMOL/L — SIGNIFICANT CHANGE UP (ref 3.5–5.3)
PROT PATTERN SERPL ELPH-IMP: SIGNIFICANT CHANGE UP
PROT SERPL-MCNC: 6.1 G/DL — SIGNIFICANT CHANGE UP (ref 6–8.3)
PROT SERPL-MCNC: 6.4 G/DL — SIGNIFICANT CHANGE UP (ref 6–8.3)
PROT SERPL-MCNC: 6.4 G/DL — SIGNIFICANT CHANGE UP (ref 6–8.3)
RBC # BLD: 4.42 M/UL — SIGNIFICANT CHANGE UP (ref 4.2–5.8)
RBC # FLD: 13.1 % — SIGNIFICANT CHANGE UP (ref 10.3–14.5)
RHEUMATOID FACT SERPL-ACNC: <10 IU/ML — SIGNIFICANT CHANGE UP (ref 0–13)
SODIUM SERPL-SCNC: 140 MMOL/L — SIGNIFICANT CHANGE UP (ref 135–145)
WBC # BLD: 5.2 K/UL — SIGNIFICANT CHANGE UP (ref 3.8–10.5)
WBC # FLD AUTO: 5.2 K/UL — SIGNIFICANT CHANGE UP (ref 3.8–10.5)

## 2019-11-07 PROCEDURE — 93306 TTE W/DOPPLER COMPLETE: CPT

## 2019-11-07 PROCEDURE — 72141 MRI NECK SPINE W/O DYE: CPT

## 2019-11-07 PROCEDURE — 70498 CT ANGIOGRAPHY NECK: CPT

## 2019-11-07 PROCEDURE — 82962 GLUCOSE BLOOD TEST: CPT

## 2019-11-07 PROCEDURE — 83735 ASSAY OF MAGNESIUM: CPT

## 2019-11-07 PROCEDURE — 80053 COMPREHEN METABOLIC PANEL: CPT

## 2019-11-07 PROCEDURE — 86334 IMMUNOFIX E-PHORESIS SERUM: CPT

## 2019-11-07 PROCEDURE — 82746 ASSAY OF FOLIC ACID SERUM: CPT

## 2019-11-07 PROCEDURE — 86592 SYPHILIS TEST NON-TREP QUAL: CPT

## 2019-11-07 PROCEDURE — 36415 COLL VENOUS BLD VENIPUNCTURE: CPT

## 2019-11-07 PROCEDURE — 85652 RBC SED RATE AUTOMATED: CPT

## 2019-11-07 PROCEDURE — 93005 ELECTROCARDIOGRAM TRACING: CPT

## 2019-11-07 PROCEDURE — 72125 CT NECK SPINE W/O DYE: CPT

## 2019-11-07 PROCEDURE — 86803 HEPATITIS C AB TEST: CPT

## 2019-11-07 PROCEDURE — 86140 C-REACTIVE PROTEIN: CPT

## 2019-11-07 PROCEDURE — 84165 PROTEIN E-PHORESIS SERUM: CPT

## 2019-11-07 PROCEDURE — 82607 VITAMIN B-12: CPT

## 2019-11-07 PROCEDURE — 84443 ASSAY THYROID STIM HORMONE: CPT

## 2019-11-07 PROCEDURE — 83036 HEMOGLOBIN GLYCOSYLATED A1C: CPT

## 2019-11-07 PROCEDURE — 85027 COMPLETE CBC AUTOMATED: CPT

## 2019-11-07 PROCEDURE — 84155 ASSAY OF PROTEIN SERUM: CPT

## 2019-11-07 PROCEDURE — 86431 RHEUMATOID FACTOR QUANT: CPT

## 2019-11-07 PROCEDURE — 70496 CT ANGIOGRAPHY HEAD: CPT

## 2019-11-07 PROCEDURE — 71045 X-RAY EXAM CHEST 1 VIEW: CPT

## 2019-11-07 PROCEDURE — 99232 SBSQ HOSP IP/OBS MODERATE 35: CPT

## 2019-11-07 PROCEDURE — 86780 TREPONEMA PALLIDUM: CPT

## 2019-11-07 PROCEDURE — 99239 HOSP IP/OBS DSCHRG MGMT >30: CPT | Mod: GC

## 2019-11-07 PROCEDURE — 86593 SYPHILIS TEST NON-TREP QUANT: CPT

## 2019-11-07 PROCEDURE — 84484 ASSAY OF TROPONIN QUANT: CPT

## 2019-11-07 PROCEDURE — 99285 EMERGENCY DEPT VISIT HI MDM: CPT | Mod: 25

## 2019-11-07 PROCEDURE — 84100 ASSAY OF PHOSPHORUS: CPT

## 2019-11-07 PROCEDURE — 80061 LIPID PANEL: CPT

## 2019-11-07 RX ORDER — ATORVASTATIN CALCIUM 80 MG/1
1 TABLET, FILM COATED ORAL
Qty: 30 | Refills: 0
Start: 2019-11-07 | End: 2019-12-06

## 2019-11-07 RX ORDER — TAMSULOSIN HYDROCHLORIDE 0.4 MG/1
1 CAPSULE ORAL
Qty: 30 | Refills: 0
Start: 2019-11-07 | End: 2019-12-06

## 2019-11-07 RX ADMIN — Medication 1 MILLIGRAM(S): at 11:15

## 2019-11-07 RX ADMIN — APIXABAN 5 MILLIGRAM(S): 2.5 TABLET, FILM COATED ORAL at 06:38

## 2019-11-07 NOTE — DISCHARGE NOTE PROVIDER - NSDCCPCAREPLAN_GEN_ALL_CORE_FT
PRINCIPAL DISCHARGE DIAGNOSIS  Diagnosis: CVA (cerebral vascular accident)  Assessment and Plan of Treatment:       SECONDARY DISCHARGE DIAGNOSES  Diagnosis: Dyslipidemia  Assessment and Plan of Treatment: Dyslipidemia    Diagnosis: Afib  Assessment and Plan of Treatment: Afib PRINCIPAL DISCHARGE DIAGNOSIS  Diagnosis: Cervical radiculopathy  Assessment and Plan of Treatment: You came in with tingling and reduced function of your right arm. We got CT head which didnt showed any acute intracranial hemorhage. 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 You have rediculopathy at levels c4-c7 and moderate degenerative disk diseaase. Your MRI results were provided to you on yuor request. Please followup as out patietn with neurologist Dr. Diaz and keep taking your medications and followup with your PCP.      SECONDARY DISCHARGE DIAGNOSES  Diagnosis: CVA (cerebral vascular accident)  Assessment and Plan of Treatment: You came in with some dysfuntions of your right arm. We got CT SCAN OF your head and no stoke was found on this admission. YOur symptoms were due to chronic changes in your cervical spine.    Diagnosis: Dyslipidemia  Assessment and Plan of Treatment: please follow dash diet and take your medications on time. Please include green leafy vegetables and fruits and low fats in diet.    Diagnosis: Afib  Assessment and Plan of Treatment: You have a history of A.fib. Keep taking your eliquis and metoprolol and followup  with your Cardiologist as out patient on regular basis. PRINCIPAL DISCHARGE DIAGNOSIS  Diagnosis: Cervical radiculopathy  Assessment and Plan of Treatment: You came in with tingling numbness and reduced function of your right arm.  We got CT head which didnt showed any acute intracranial hemorhage. 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 You have rediculopathy at levels C4-C7 and moderate degenerative disk diseaase. Your MRI results were provided to you on your request. Please followup as out patient with neurologist Dr. Diaz and keep taking your medications and followup with your PCP.      SECONDARY DISCHARGE DIAGNOSES  Diagnosis: CVA (cerebral vascular accident)  Assessment and Plan of Treatment: You came in with some dysfuntions of your right arm. We got CT Angiogram of your head and neck and no acute pathology was seen. You was seen by Neurologist Dr. Lainez during your stay. Yuur symptoms were due to chronic changes in your cervical spine.THERE WAS NO EVIDENCE OF ACUTE STROKE DURING THIS ADMISSION.   NIHS Score 0      Diagnosis: Dyslipidemia  Assessment and Plan of Treatment: please follow dash diet and take your medications on time. Please include green leafy vegetables and fruits and low fats in diet.    Diagnosis: Afib  Assessment and Plan of Treatment: You have a history of chronic A.fib. You had recent Ablation. Keep taking your Eliquis (Apixaban) and metoprolol and followup  with your Cardiologist (RElectrophysiologist) as out patient on regular basis.

## 2019-11-07 NOTE — DISCHARGE NOTE NURSING/CASE MANAGEMENT/SOCIAL WORK - NSDCPEELIQUIS_GEN_ALL_CORE
Apixaban/Eliquis - Compliance/Apixaban/Eliquis - Dietary Advice/Apixaban/Eliquis - Follow up monitoring/Apixaban/Eliquis - Potential for adverse drug reactions and interactions

## 2019-11-07 NOTE — DISCHARGE NOTE PROVIDER - HOSPITAL COURSE
HPI:    69M from home, works as , PMHx a-fib (s/p ablation Aug 2019,  loop recorder(Medtronic)) on Eliquis, dyslipidemia, prostate cancer (RT on 2012) presented to ED c/o Difficulty in coordination of Rt. arm. He states since 5:30pm yesterday, he has difficulty coordinating his Rt. arm, if he concentrates, he is able to hold otherwise it is difficult for him to hold using his Rt. arm. He also complaints of numbness over lower lip and decreased sensation on his Rt. hand with difficulty articulating words since yesterday evening. He states he had these symptoms on saturday that lasted for 5hrs, he called his neurologist after the episode and was told that he might have stroke. He had stroke 1 year ago diagnosed with MRI. He follows up with his EP Dr. Bustamante, last visit was one week ago. (05 Nov 2019 15:05)    CVA (cerebral vascular accident). NIHSS 2 on admission, CTA head and neck negative for infarct or bleed, EKG shows NSR@75bpm, did not receive TPA as out of window,  TTE >55,  HbA1C 5.7 and fasting lipid profile wnl, CT cervical spine mild multilevel cervical spondylosis, suspected cervical spondylomylopathy as per neuro,     Afib.  s/p ablation Aug 2019,  loop recorder(Medtronic) on Eliquis    on metoprolol for rate control    rate controlled    will hold off metoprolol for permissive Your blood pressure was well-controlled during your admission. Continue with your current regimen of anti-hypertensive medications as mentioned in your discharge summary and ensure that you follow up with your primary care provider for further recommendations and monitoring.     Afib. s/p ablation Aug 2019,  loop recorder(Medtronic) on Eliquis, metoprolol for rate control, hold off metoprolol for permissive Your blood pressure was well-controlled during your admission. Continue with your current regimen of anti-hypertensive medications as mentioned in your discharge summary and ensure that you follow up with your primary care provider for further recommendations and monitoring.    Dyslipidemia C/W with Stain therapy    .    .

## 2019-11-07 NOTE — DISCHARGE NOTE PROVIDER - PROVIDER TOKENS
FREE:[LAST:[moraima],FIRST:[Rona],PHONE:[(   )    -],FAX:[(   )    -]] PROVIDER:[TOKEN:[[id number]]],FREE:[LAST:[Haroon],FIRST:[Rona],PHONE:[phone]:[(   )    -]]

## 2019-11-07 NOTE — DISCHARGE NOTE NURSING/CASE MANAGEMENT/SOCIAL WORK - PATIENT PORTAL LINK FT
You can access the FollowMyHealth Patient Portal offered by [facility] by registering at the following website: [email or web address]. By joining [employer]’s FollowMyHealth portal, you will also be able to view your health information using other applications (apps) compatible with our system.

## 2019-11-07 NOTE — DISCHARGE NOTE PROVIDER - NSDCFUSCHEDAPPT_GEN_ALL_CORE_FT
PA[address] [facility] ; 11/20/2019 ; [facility] [address] ; 11/27/2019 ; NP OrthoSurg [address] ; 12/04/2019 ; [facility] [address] ; 11/20/2019 ; [facility] [address] ; 11/27/2019 ; NP OrthoSurg [address] ; 12/04/2019 ; [facility] [address] ; 11/20/2019 ; [facility] [address] ; 11/27/2019 ; NP OrthoSurg [address] ; 12/04/2019 ; [facility] [address] ; 11/20/2019 ; [facility] [address] ; 11/27/2019 ; NP OrthoSurg [address] ; 12/04/2019 ; [facility] [address]

## 2019-11-07 NOTE — DISCHARGE NOTE PROVIDER - NSDCMRMEDTOKEN_GEN_ALL_CORE_FT
apixaban 5 mg oral tablet: 1 tab(s) orally 2 times a day  folic acid 1 mg oral tablet: 1 tab(s) orally once a day  Livalo 2 mg oral tablet: 1 tab(s) orally once a day  Metoprolol Succinate ER 25 mg oral tablet, extended release: 1 tab(s) orally once a day  Protonix 40 mg oral delayed release tablet: 1 tab(s) orally once a day  Rapaflo 8 mg oral capsule: 1 cap(s) orally once a day (at bedtime) apixaban 5 mg oral tablet: 1 tab(s) orally 2 times a day  atorvastatin 40 mg oral tablet: 1 tab(s) orally once a day (at bedtime)  folic acid 1 mg oral tablet: 1 tab(s) orally once a day  Livalo 2 mg oral tablet: 1 tab(s) orally once a day  Metoprolol Succinate ER 25 mg oral tablet, extended release: 1 tab(s) orally once a day  Protonix 40 mg oral delayed release tablet: 1 tab(s) orally once a day  Rapaflo 8 mg oral capsule: 1 cap(s) orally once a day (at bedtime)  tamsulosin 0.4 mg oral capsule: 1 cap(s) orally once a day (at bedtime) apixaban 5 mg oral tablet: 1 tab(s) orally 2 times a day  atorvastatin 40 mg oral tablet: 1 tab(s) orally once a day (at bedtime)  folic acid 1 mg oral tablet: 1 tab(s) orally once a day  Metoprolol Succinate ER 25 mg oral tablet, extended release: 1 tab(s) orally once a day  Protonix 40 mg oral delayed release tablet: 1 tab(s) orally once a day  Rapaflo 8 mg oral capsule: 1 cap(s) orally once a day (at bedtime)  tamsulosin 0.4 mg oral capsule: 1 cap(s) orally once a day (at bedtime) apixaban 5 mg oral tablet: 1 tab(s) orally 2 times a day  atorvastatin 40 mg oral tablet: 1 tab(s) orally once a day (at bedtime)  folic acid 1 mg oral tablet: 1 tab(s) orally once a day  Metoprolol Succinate ER 25 mg oral tablet, extended release: 1 tab(s) orally once a day  Protonix 40 mg oral delayed release tablet: 1 tab(s) orally once a day  Rapaflo 8 mg oral capsule: 1 cap(s) orally once a day (at bedtime)

## 2019-11-07 NOTE — DISCHARGE NOTE PROVIDER - NSDCFUADDAPPT_GEN_ALL_CORE_FT
Please followup as an out pt with Dr. Diaz for your hand numbness.  Keep taking your medications and followup with your PCP within 1 week of your discharge.

## 2019-11-07 NOTE — PROGRESS NOTE ADULT - SUBJECTIVE AND OBJECTIVE BOX
Pt seen in initial neurologic consultation yesterday; refer to that note for details of Hx and exam findings.  The examination was indicative of a (mild) cervical myelopathy.  C-spine MRI was performed; images reviewed; per radiologist's report:      "Cervical intervertebral discs show moderate degenerative disc disease and   spondylosis at C4-5 through C6-7 with loss of disc height and associated   degenerative endplate changes most prominent at C4-5 with exuberant   anterior osteophyte formation. There is narrowing of the RIGHT C3-4,   RIGHT C4-5 and BILATERAL C5-6 neural foramina due to uncovertebral   spurring and facet osteophytic hypertrophy.    The cervical cord maintains intact morphology  No cord signal intensity   abnormality or focal cord lesion is appreciated.  The cervical medullary   junction remains intact.      IMPRESSION: Moderate degenerative disc disease and spondylosis at C4-5   through C6-7 with loss of disc height and associated degenerative   endplate changes most prominent at C4-5 with exuberant anterior   osteophyte formation. There is narrowing of the RIGHT C3-4, RIGHT C4-5   and BILATERAL C5-6 neural foramina due to uncovertebral spurring and   facet osteophytic hypertrophy."      Other recommended tests:      ESR/CRP  7/<0.1  B12/folate  655/>20  RF neg  syphilis serology pending  serum protein electrophoreses also pending.         Pt clinically stable.       DISCUSSION and RECOMMENDATIONS    Would have Pt follow-up with a neurosurgeon after discharge. The question to be addressed is whether a flexion/extension study is indicated to rule out instability (which could result in myelopathic findings in the absence of evident cord impingement/compression in neutral position.  Also follow-up syphilis serology and SPEP resukts.
PGY1 Note discussed with Supervising Resident and Primary Attending.    Patient is a 69y old  Male who presents with a chief complaint of Difficulty in coordination of Rt. arm (06 Nov 2019 10:32)      INTERVAL HPI/OVERNIGHT EVENTS :    ***********************************************************************************************************    MEDICATIONS  (STANDING):  apixaban 5 milliGRAM(s) Oral every 12 hours  atorvastatin 40 milliGRAM(s) Oral at bedtime  folic acid 1 milliGRAM(s) Oral daily  tamsulosin 0.4 milliGRAM(s) Oral at bedtime    MEDICATIONS  (PRN):      ***********************************************************************************************************    Allergies    No Known Allergies    Intolerances        ***********************************************************************************************************    REVIEW OF SYSTEMS :  * CONSTITUTIONAL      : No Fever, Weight loss, or Fatigue  * EYES                             : No eye pain , Visual disturbances   * RESPIRATORY             : No Cough, Wheezing,  No shortness of breath  * CARDIOVASCULAR     : No Chest pain, Palpitations, Dizziness, or Leg swelling  * GASTROINTESTINAL  : No Abdominal or Epigastric pain. No Nausea, Vomiting or Hematemesis; No Diarrhea or Constipation.  * GENITOURINARY        : No Dysuria , Frequency , Haematuria   * NEUROLOGICAL           Memory loss, Loss of trength, Numbness, or Tremors  * MUSCULOSKELETAL   : No Joint pain, some tingling in right hand 3rd to 5 th finger  * PSYCHIATRY                 : No Depression or Anxiety   * HEME/LYMPH              : No Easy Bruising or Bleeding gums  * SKIN                               : No Itching, Burning, Rashes, or Lesions     ***********************************************************************************************************    Vital Signs Last 24 Hrs  T(C): 36.8 (06 Nov 2019 14:06), Max: 37.1 (05 Nov 2019 20:36)  T(F): 98.3 (06 Nov 2019 14:06), Max: 98.8 (05 Nov 2019 20:36)  HR: 86 (06 Nov 2019 14:06) (68 - 89)  BP: 120/76 (06 Nov 2019 14:06) (91/57 - 132/86)  BP(mean): --  RR: 17 (06 Nov 2019 14:06) (17 - 19)  SpO2: 98% (06 Nov 2019 14:06) (95% - 99%)    ***********************************************************************************************************    PHYSICAL EXAM :  * GENERAL                 : NAD,  Well-developed  * HEAD                       :  Atraumatic, Normocephalic  * EYES                         : , Conjunctiva and Sclera clear  * ENT                           : Moist Mucous Membranes  * NECK                         : Supple, No JVD, Normal Thyroid  * CHEST/LUNG           : Clear to Auscultation bilaterally; No Rales,   * HEART                       : Regular Rate and Rhythm; No murmurs,  * ABDOMEN                : Soft, Non-tender, Non-distended; Bowel Sounds present  * NERVOUS SYSTEM  :  Alert & Oriented X3, Good Concentration; quadroparesis more on right side than left   * EXTREMITIES            :   No clubbing, cyanosis, or edema  * SKIN                           : No Rashes or Lesions    **********************************************************************************************************  LABS:                          14.3   5.33  )-----------( 182      ( 06 Nov 2019 06:59 )             43.5     11-06    139  |  104  |  16  ----------------------------<  106<H>  3.9   |  28  |  0.99    Ca    9.3      06 Nov 2019 06:59  Phos  3.3     11-06  Mg     2.1     11-06    TPro  6.6  /  Alb  3.6  /  TBili  0.8  /  DBili  x   /  AST  9<L>  /  ALT  17  /  AlkPhos  49  11-06        CAPILLARY BLOOD GLUCOSE          **********************************************************************************************************    RADIOLOGY & ADDITIONAL TESTS:   No radiological imaging was required    Imaging Personally Reviewed   :  [x ] YES  [ ] NO    Consultant(s) Notes Reviewed :  [ x] YES  [ ] NO

## 2019-11-07 NOTE — DISCHARGE NOTE PROVIDER - CARE PROVIDER_API CALL
Rona valera  Phone: (   )    -  Fax: (   )    -  Follow Up Time: Mariah Diaz)  Clinical Neurophysiology; Neurology  [address], [address] [place] [address]  Phone: [phone]  Fax: [phone]  Follow Up Time:     Rona Patiño  Phone: [phone]  Fax: (   )    -  Follow Up Time:

## 2019-11-09 LAB
RPR SER-TITR: SIGNIFICANT CHANGE UP
RPR SERPL-ACNC: SIGNIFICANT CHANGE UP
T PALLIDUM AB TITR SER: POSITIVE
T PALLIDUM IGG SER QL IF: REACTIVE

## 2019-11-20 ENCOUNTER — APPOINTMENT (OUTPATIENT)
Dept: ORTHOPEDIC SURGERY | Facility: CLINIC | Age: 69
End: 2019-11-20
Payer: COMMERCIAL

## 2019-11-20 VITALS — BODY MASS INDEX: 27.43 KG/M2 | WEIGHT: 181 LBS | HEIGHT: 68 IN

## 2019-11-20 PROCEDURE — 20610 DRAIN/INJ JOINT/BURSA W/O US: CPT | Mod: RT

## 2019-11-20 NOTE — PROCEDURE
[de-identified] : Right Knee Euflexxa #1\par Discussed at length with the patient the planned Euflexxa injection. The risks, benefits, convalescence and alternatives were reviewed. The possible side effects discussed included but were not limited to: pain, swelling, heat and redness. There symptoms are generally mild but if they are extensive then contact the office. Giving pain relievers by mouth such as NSAID’s or Tylenol can generally treat the reactions to Euflexxa. Rare cases of infection have been noted. Rash, hives and itching may occur post injection. If you have muscle pain or cramps, flushing and or swelling of the face, rapid heart beat, nausea, dizziness, fever, chills, headache, difficulty breathing, swelling in the arms or legs, or have a prickly feeling of your skin, contact a health care provider immediately.\par \par Following this discussion, the knee was prepped with betadine and under sterile condition the Euflexxa injection was performed with a 21 gauge needle. The needle was introduced into the joint, aspiration was performed to ensure intra-articular placement and the medication was injected. Upon withdrawal of the needle the site was cleaned with alcohol and a bandaid applied. The patient tolerated the injection well and there were no adverse effects. Post injection instructions included no strenuous activity for 24 hours, cryotherapy and if there are any adverse effects to contact the office.\par

## 2019-11-27 ENCOUNTER — APPOINTMENT (OUTPATIENT)
Dept: ORTHOPEDIC SURGERY | Facility: CLINIC | Age: 69
End: 2019-11-27
Payer: COMMERCIAL

## 2019-11-27 VITALS — HEIGHT: 68 IN | BODY MASS INDEX: 27.28 KG/M2 | WEIGHT: 180 LBS

## 2019-11-27 PROCEDURE — 20610 DRAIN/INJ JOINT/BURSA W/O US: CPT | Mod: RT

## 2019-11-27 NOTE — PROCEDURE
[de-identified] : Euflexxa # 2 Right Knee\par Discussed at length with the patient the planned Euflexxa injection. The risks, benefits, convalescence and alternatives were reviewed. The possible side effects discussed included but were not limited to: pain, swelling, heat and redness. There symptoms are generally mild but if they are extensive then contact the office. Giving pain relievers by mouth such as NSAID’s or Tylenol can generally treat the reactions to Euflexxa. Rare cases of infection have been noted. Rash, hives and itching may occur post injection. If you have muscle pain or cramps, flushing and or swelling of the face, rapid heart beat, nausea, dizziness, fever, chills, headache, difficulty breathing, swelling in the arms or legs, or have a prickly feeling of your skin, contact a health care provider immediately.\par \par Following this discussion, the knee was prepped with betadine and under sterile condition the Euflexxa injection was performed with a 22 gauge needle. The needle was introduced into the joint, aspiration was performed to ensure intra-articular placement and the medication was injected. Upon withdrawal of the needle the site was cleaned with alcohol and a bandaid applied. The patient tolerated the injection well and there were no adverse effects. Post injection instructions included no strenuous activity for 24 hours, cryotherapy and if there are any adverse effects to contact the office.\par

## 2019-11-28 ENCOUNTER — TRANSCRIPTION ENCOUNTER (OUTPATIENT)
Age: 69
End: 2019-11-28

## 2019-12-04 ENCOUNTER — APPOINTMENT (OUTPATIENT)
Dept: ORTHOPEDIC SURGERY | Facility: CLINIC | Age: 69
End: 2019-12-04
Payer: COMMERCIAL

## 2019-12-04 VITALS — BODY MASS INDEX: 27.28 KG/M2 | HEIGHT: 68 IN | WEIGHT: 180 LBS

## 2019-12-04 PROCEDURE — 20610 DRAIN/INJ JOINT/BURSA W/O US: CPT | Mod: RT

## 2019-12-04 NOTE — PROCEDURE
[de-identified] : Euflexxa # 3 Right Knee\par Discussed at length with the patient the planned Euflexxa injection. The risks, benefits, convalescence and alternatives were reviewed. The possible side effects discussed included but were not limited to: pain, swelling, heat and redness. There symptoms are generally mild but if they are extensive then contact the office. Giving pain relievers by mouth such as NSAID’s or Tylenol can generally treat the reactions to Euflexxa. Rare cases of infection have been noted. Rash, hives and itching may occur post injection. If you have muscle pain or cramps, flushing and or swelling of the face, rapid heart beat, nausea, dizziness, fever, chills, headache, difficulty breathing, swelling in the arms or legs, or have a prickly feeling of your skin, contact a health care provider immediately.\par \par Following this discussion, the knee was prepped with betadine and under sterile condition the Euflexxa injection was performed with a 22 gauge needle. The needle was introduced into the joint, aspiration was performed to ensure intra-articular placement and the medication was injected. Upon withdrawal of the needle the site was cleaned with alcohol and a bandaid applied. The patient tolerated the injection well and there were no adverse effects. Post injection instructions included no strenuous activity for 24 hours, cryotherapy and if there are any adverse effects to contact the office.\par

## 2020-01-23 ENCOUNTER — APPOINTMENT (OUTPATIENT)
Dept: NEUROLOGY | Facility: CLINIC | Age: 70
End: 2020-01-23

## 2020-05-15 ENCOUNTER — APPOINTMENT (OUTPATIENT)
Dept: ORTHOPEDIC SURGERY | Facility: CLINIC | Age: 70
End: 2020-05-15
Payer: COMMERCIAL

## 2020-05-15 DIAGNOSIS — M17.11 UNILATERAL PRIMARY OSTEOARTHRITIS, RIGHT KNEE: ICD-10-CM

## 2020-05-15 PROCEDURE — 73560 X-RAY EXAM OF KNEE 1 OR 2: CPT | Mod: LT

## 2020-05-15 PROCEDURE — 73564 X-RAY EXAM KNEE 4 OR MORE: CPT | Mod: RT

## 2020-05-15 PROCEDURE — 99213 OFFICE O/P EST LOW 20 MIN: CPT

## 2020-05-15 NOTE — PHYSICAL EXAM
[de-identified] : Constitutional: Height and weight as reported by patient. Well groomed and in no apparent respiratory distress.\par Cardiovascular: No lower extremity edema. No lower extremity varicosities.\par Neurologic/Psychiatric: Oriented to time, place and person. Mood and affect within normal limits.\par Skin: No observed rashes on lower extremities. \par Gait: nonantalgic with no assistive device, arising from the seated position independently.\par Spine: cervical spine appears normal and moves freely, thoracic spine appears normal and moves freely, lumbosacral spine appears normal and moves freely. \par \par Right knee\par Inspection: mild effusion \par Wounds: healed arthroscopic portals \par Alignment: normal.\par Palpation: medial and lateral  tenderness on palpation.\par ROM: Active (in degrees) 0-135 with crepitus\par Ligamentous laxity (neg): all ligaments appear stable, negative ant. drawer test, negative post. drawer test, stable to varus stress test, stable to valgus stress test, negative Lachman's test, negative pivot shift test,\par Meniscal Test: negative McMurrays, negative Candelarai.\par Patellofemoral Alignment Test: Q angle-, normal.\par Muscle Test: good quad strength.\par Leg examination: calf is soft and non-tender. [de-identified] : Right Knee xrays, taken at the office today show:, standing AP/Lateral and Merchant films, and 45 degree PA standing view, normal alignment, lateral joint space narrowing especially on the Back view, small peripheral osteophytes, patellofemoral joint space narrowing with lateral tracking, small marginal osteophytes, Kellgren and Dejan grade 2-3\par \par Left knee merchant view taken at the office today demonstrates lateral tracking patella with lateral joint space narrowing, small marginal osteophytes consistent with patellofemoral degenerative arthritis.

## 2020-05-15 NOTE — DISCUSSION/SUMMARY
[de-identified] : Discussed at length the nature of the patients condition. Their right knee symptoms are persisting secondary to degenerative arthritis. We will continue with nonoperative treatment. Since he's had a good prior response, we will seek authorization for another series of right knee Euflexxa injections. Once we receive authorization, we will proceed accordingly. I also suggested continuation of a home exercise program including walking, He may continue with application of Voltaren Gel for pain relief as well as weight loss. He cannot take PO anti-inflammatory medications due to being on Eliquis. They can continue activities as tolerated.  I did explain that if in the future his pain becomes disabling, that he may need total knee replacement.

## 2020-05-15 NOTE — HISTORY OF PRESENT ILLNESS
[de-identified] : 69 year old male presents for follow up evaluation of his right arthritic knee.  Patient has been undergoing nonoperative treatment and has been doing well.  He underwent his last series of Euflexxa in December 2019 which provided good relief of his pain. He states his right knee pain has started to return in the outer aspect of the joint. He would like to repeat the viscosupplementation at this time as it has been beneficial in the past. He has continued to use Voltaren Gel for pain relief as he cannot take PO anti-inflammatory medications due to being on Eliquis. He walks 30 min/day for exercise but reports it has become more difficult due to pain.

## 2020-05-15 NOTE — ADDENDUM
[FreeTextEntry1] : This note was written by Asha Geiger on 05/15/2020 acting as scribe for Dr. Suman King M.D.\par \par I, Dr. Suman King M.D., have read and attest that all the information, medical decision making and discharge instructions within are true and accurate.

## 2020-06-12 ENCOUNTER — APPOINTMENT (OUTPATIENT)
Dept: ORTHOPEDIC SURGERY | Facility: CLINIC | Age: 70
End: 2020-06-12
Payer: COMMERCIAL

## 2020-06-12 VITALS — WEIGHT: 180 LBS | BODY MASS INDEX: 27.28 KG/M2 | HEIGHT: 68 IN

## 2020-06-12 PROCEDURE — 20610 DRAIN/INJ JOINT/BURSA W/O US: CPT | Mod: RT

## 2020-06-12 NOTE — PROCEDURE
[de-identified] : Euflexxa # 1 Right Knee\par Discussed at length with the patient the planned Euflexxa injection. The risks, benefits, convalescence and alternatives were reviewed. The possible side effects discussed included but were not limited to: pain, swelling, heat and redness. There symptoms are generally mild but if they are extensive then contact the office. Giving pain relievers by mouth such as NSAID’s or Tylenol can generally treat the reactions to Euflexxa. Rare cases of infection have been noted. Rash, hives and itching may occur post injection. If you have muscle pain or cramps, flushing and or swelling of the face, rapid heart beat, nausea, dizziness, fever, chills, headache, difficulty breathing, swelling in the arms or legs, or have a prickly feeling of your skin, contact a health care provider immediately.\par \par Following this discussion, the knee was prepped with betadine and under sterile condition the Euflexxa injection was performed with a 22 gauge needle. The needle was introduced into the joint, aspiration was performed to ensure intra-articular placement and the medication was injected. Upon withdrawal of the needle the site was cleaned with alcohol and a bandaid applied. The patient tolerated the injection well and there were no adverse effects. Post injection instructions included no strenuous activity for 24 hours, cryotherapy and if there are any adverse effects to contact the office.\par

## 2020-06-19 ENCOUNTER — APPOINTMENT (OUTPATIENT)
Dept: ORTHOPEDIC SURGERY | Facility: CLINIC | Age: 70
End: 2020-06-19
Payer: COMMERCIAL

## 2020-06-19 VITALS — WEIGHT: 180 LBS | BODY MASS INDEX: 27.28 KG/M2 | HEIGHT: 68 IN

## 2020-06-19 VITALS — TEMPERATURE: 97.9 F

## 2020-06-19 PROCEDURE — 20610 DRAIN/INJ JOINT/BURSA W/O US: CPT | Mod: RT

## 2020-06-19 NOTE — PROCEDURE
[de-identified] : Right Knee Euflexxa #2\par Discussed at length with the patient the planned Euflexxa injection. The risks, benefits, convalescence and alternatives were reviewed. The possible side effects discussed included but were not limited to: pain, swelling, heat and redness. There symptoms are generally mild but if they are extensive then contact the office. Giving pain relievers by mouth such as NSAID’s or Tylenol can generally treat the reactions to Euflexxa. Rare cases of infection have been noted. Rash, hives and itching may occur post injection. If you have muscle pain or cramps, flushing and or swelling of the face, rapid heart beat, nausea, dizziness, fever, chills, headache, difficulty breathing, swelling in the arms or legs, or have a prickly feeling of your skin, contact a health care provider immediately.\par \par Following this discussion, the knee was prepped with betadine and under sterile condition the Euflexxa injection was performed with a 21 gauge needle. The needle was introduced into the joint, aspiration was performed to ensure intra-articular placement and the medication was injected. Upon withdrawal of the needle the site was cleaned with alcohol and a bandaid applied. The patient tolerated the injection well and there were no adverse effects. Post injection instructions included no strenuous activity for 24 hours, cryotherapy and if there are any adverse effects to contact the office.\par

## 2020-06-26 ENCOUNTER — APPOINTMENT (OUTPATIENT)
Dept: ORTHOPEDIC SURGERY | Facility: CLINIC | Age: 70
End: 2020-06-26
Payer: COMMERCIAL

## 2020-06-26 VITALS — TEMPERATURE: 98.2 F

## 2020-06-26 VITALS — BODY MASS INDEX: 27.28 KG/M2 | WEIGHT: 180 LBS | HEIGHT: 68 IN

## 2020-06-26 DIAGNOSIS — M17.11 UNILATERAL PRIMARY OSTEOARTHRITIS, RIGHT KNEE: ICD-10-CM

## 2020-06-26 PROCEDURE — 20610 DRAIN/INJ JOINT/BURSA W/O US: CPT | Mod: RT

## 2020-06-26 NOTE — PROCEDURE
[de-identified] : Euflexxa # 3 Right Knee\par Discussed at length with the patient the planned Euflexxa injection. The risks, benefits, convalescence and alternatives were reviewed. The possible side effects discussed included but were not limited to: pain, swelling, heat and redness. There symptoms are generally mild but if they are extensive then contact the office. Giving pain relievers by mouth such as NSAID’s or Tylenol can generally treat the reactions to Euflexxa. Rare cases of infection have been noted. Rash, hives and itching may occur post injection. If you have muscle pain or cramps, flushing and or swelling of the face, rapid heart beat, nausea, dizziness, fever, chills, headache, difficulty breathing, swelling in the arms or legs, or have a prickly feeling of your skin, contact a health care provider immediately.\par \par Following this discussion, the knee was prepped with betadine and under sterile condition the Euflexxa injection was performed with a 22 gauge needle. The needle was introduced into the joint, aspiration was performed to ensure intra-articular placement and the medication was injected. Upon withdrawal of the needle the site was cleaned with alcohol and a bandaid applied. The patient tolerated the injection well and there were no adverse effects. Post injection instructions included no strenuous activity for 24 hours, cryotherapy and if there are any adverse effects to contact the office.\par

## 2020-07-29 ENCOUNTER — APPOINTMENT (OUTPATIENT)
Dept: OPHTHALMOLOGY | Facility: CLINIC | Age: 70
End: 2020-07-29
Payer: MEDICARE

## 2020-07-29 ENCOUNTER — NON-APPOINTMENT (OUTPATIENT)
Age: 70
End: 2020-07-29

## 2020-07-29 PROCEDURE — 92136 OPHTHALMIC BIOMETRY: CPT

## 2020-07-29 PROCEDURE — 92012 INTRM OPH EXAM EST PATIENT: CPT

## 2020-08-14 DIAGNOSIS — Z01.818 ENCOUNTER FOR OTHER PREPROCEDURAL EXAMINATION: ICD-10-CM

## 2020-08-15 ENCOUNTER — APPOINTMENT (OUTPATIENT)
Dept: DISASTER EMERGENCY | Facility: CLINIC | Age: 70
End: 2020-08-15

## 2020-08-15 LAB — SARS-COV-2 N GENE NPH QL NAA+PROBE: NOT DETECTED

## 2020-08-17 ENCOUNTER — TRANSCRIPTION ENCOUNTER (OUTPATIENT)
Age: 70
End: 2020-08-17

## 2020-08-18 ENCOUNTER — NON-APPOINTMENT (OUTPATIENT)
Age: 70
End: 2020-08-18

## 2020-08-18 ENCOUNTER — OUTPATIENT (OUTPATIENT)
Dept: OUTPATIENT SERVICES | Facility: HOSPITAL | Age: 70
LOS: 1 days | Discharge: HOME | End: 2020-08-18

## 2020-08-18 ENCOUNTER — APPOINTMENT (OUTPATIENT)
Dept: OPHTHALMOLOGY | Facility: AMBULATORY SURGERY CENTER | Age: 70
End: 2020-08-18
Payer: MEDICARE

## 2020-08-18 DIAGNOSIS — Z98.89 OTHER SPECIFIED POSTPROCEDURAL STATES: Chronic | ICD-10-CM

## 2020-08-18 DIAGNOSIS — Z90.49 ACQUIRED ABSENCE OF OTHER SPECIFIED PARTS OF DIGESTIVE TRACT: Chronic | ICD-10-CM

## 2020-08-18 DIAGNOSIS — Z85.46 PERSONAL HISTORY OF MALIGNANT NEOPLASM OF PROSTATE: Chronic | ICD-10-CM

## 2020-08-18 PROCEDURE — 66984 XCAPSL CTRC RMVL W/O ECP: CPT | Mod: LT

## 2020-08-19 ENCOUNTER — NON-APPOINTMENT (OUTPATIENT)
Age: 70
End: 2020-08-19

## 2020-08-19 ENCOUNTER — APPOINTMENT (OUTPATIENT)
Dept: OPHTHALMOLOGY | Facility: CLINIC | Age: 70
End: 2020-08-19
Payer: MEDICARE

## 2020-08-19 PROCEDURE — 99024 POSTOP FOLLOW-UP VISIT: CPT

## 2020-08-24 ENCOUNTER — NON-APPOINTMENT (OUTPATIENT)
Age: 70
End: 2020-08-24

## 2020-08-24 ENCOUNTER — APPOINTMENT (OUTPATIENT)
Dept: OPHTHALMOLOGY | Facility: CLINIC | Age: 70
End: 2020-08-24
Payer: MEDICARE

## 2020-08-24 PROCEDURE — 92134 CPTRZ OPH DX IMG PST SGM RTA: CPT

## 2020-08-24 PROCEDURE — 99024 POSTOP FOLLOW-UP VISIT: CPT

## 2020-09-09 ENCOUNTER — NON-APPOINTMENT (OUTPATIENT)
Age: 70
End: 2020-09-09

## 2020-09-09 ENCOUNTER — APPOINTMENT (OUTPATIENT)
Dept: OPHTHALMOLOGY | Facility: CLINIC | Age: 70
End: 2020-09-09
Payer: MEDICARE

## 2020-09-09 PROCEDURE — 99024 POSTOP FOLLOW-UP VISIT: CPT

## 2020-09-30 NOTE — PATIENT PROFILE ADULT - NSPROMUTANXFEARADDRESSFT_GEN_A_NUR
Medicare Wellness Visit  Plan for Preventive Care    A good way for you to stay healthy is to use preventive care.  Medicare covers many services that can help you stay healthy.* The goal of these services is to find any health problems as quickly as possible. Finding problems early can help make them easier to treat.  Your personal plan below lists the services you may need and when they are due.     Health Maintenance Summary     DTaP/Tdap/Td Vaccine (1 - Tdap)  Overdue since 8/1/1966    Colorectal Cancer Screening-Colonoscopy (Every 10 Years)  Overdue since 8/1/1997    Shingles Vaccine (1 of 2)  Overdue since 8/1/1997    Breast Cancer Screening (Every 2 Years)  Ordered on 8/26/2020    Medicare Wellness Visit (Yearly)  Overdue since 7/1/2012    Osteoporosis Screening (Once)  Overdue since 8/1/2012    Influenza Vaccine (1)  Overdue since 9/1/2020    Depression Screening (Yearly)  Next due on 7/13/2021    Pneumococcal Vaccine 65+ (2 of 2 - PPSV23)  Next due on 8/26/2021    Hepatitis C Screening   Completed    Hepatitis B Vaccine   Aged Out    Meningococcal Vaccine   Aged Out    HPV Vaccine   Aged Out           Preventive Care for Women and Men    Heart Screenings (Cardiovascular):  · Blood tests are used to check your cholesterol, lipid and triglyceride levels. High levels can increase your risk for heart disease and stroke. High levels can be treated with medications, diet and exercise. Lowering your levels can help keep your heart and blood vessels healthy.  Your provider will order these tests if they are needed.    · An ultrasound is done to see if you have an abdominal aortic aneurysm (AAA).  This is an enlargement of one of the main blood vessels that delivers blood to the body.   In the [place], 9,000 deaths are caused by AAA.  You may not even know you have this problem and as many as 1 in 3 people will have a serious problem if it is not treated.  Early diagnosis allows for more effective treatment  and cure.  If you have a family history of AAA or are a male age 65-75 who has smoked, you are at higher risk of an AAA.  Your provider can order this test, if needed.    Colorectal Screening:  · There are many tests that are used to check for cancer of your colon and rectum. You and your provider should discuss what test is best for you and when to have it done.  Options include:  · Screening Colonoscopy: exam of the entire colon, seen through a flexible lighted tube.  · Flexible Sigmoidoscopy: exam of the last third (sigmoid portion) of the colon and rectum, seen through a flexible lighted tube.  · Cologuard DNA stool test: a sample of your stool is used to screen for cancer and unseen blood in your stool.  · Fecal Occult Blood Test: a sample of your stool is studied to find any unseen blood    Flu Shot:  · An immunization that helps to prevent influenza (the flu). You should get this every year. The best time to get the shot is in the fall.    Pneumococcal Shot:  • Vaccines are available that can help prevent pneumococcal disease, which is any type of infection caused by Streptococcus pneumoniae bacteria.   Their use can prevent some cases of pneumonia, meningitis, and sepsis. There are two types of pneumococcal vaccines:   o Conjugate vaccines (PCV-13 or Prevnar 13®) - helps protect against the 13 types of pneumococcal bacteria that are the most common causes of serious infections in children and adults.    o Polysaccharide vaccine (PPSV23 or [id number]®) - helps protect against 23 types of pneumococcal bacteria for patients who are recommended to get it.  These vaccines should be given at least 12 months apart.  A booster is usually not needed.     Hepatitis B Shot:  · An immunization that helps to protect people from getting Hepatitis B. Hepatitis B is a virus that spreads through contact with infected blood or body fluids. Many people with the virus do not have symptoms.  The virus can lead to serious  problems, such as liver disease. Some people are at higher risk than others. Your doctor will tell you if you need this shot.     Diabetes Screening:  · A test to measure sugar (glucose) in your blood is called a fasting blood sugar. Fasting means you cannot have food or drink for at least 8 hours before the test. This test can detect diabetes long before you may notice symptoms.    Glaucoma Screening:  · Glaucoma screening is performed by your eye doctor. The test measures the fluid pressure inside your eyes to determine if you have glaucoma.     Hepatitis C Screening:  · A blood test to see if you have the hepatitis C virus.  Hepatitis C attacks the liver and is a major cause of chronic liver disease.  Medicare will cover a single screening for all adults born between 1945 & 1965, or high risk patients (people who have injected illegal drugs or people who have had blood transfusions).  High risk patients who continue to inject illegal drugs can be screened for Hepatitis C every year.    Smoking and Tobacco-Use Cessation Counseling:  · Tobacco is the single greatest cause of disease and early death in our country today. Medication and counseling together can increase a person’s chance of quitting for good.   · Medicare covers two quitting attempts per year, with four counseling sessions per attempt (eight sessions in a 12 month period)    Preventive Screening tests for Women    Screening Mammograms and Breast Exams:  · An x-ray of your breasts to check for breast cancer before you or your doctor may be able to feel it.  If breast cancer is found early it can usually be treated with success.    Pelvic Exams and Pap Tests:  · An exam to check for cervical and vaginal cancer. A Pap test is a lab test in which cells are taken from your cervix and sent to the lab to look for signs of cervical cancer. If cancer of the cervix is found early, chances for a cure are good. Testing can generally end at age 65, or if a woman  has a hysterectomy for a benign condition. Your provider may recommend more frequent testing if certain abnormal results are found.    Bone Mass Measurements:  · A painless x-ray of your bone density to see if you are at risk for a broken bone. Bone density refers to the thickness of bones or how tightly the bone tissue is packed.    Preventive Screening tests for Men    Prostate Screening:  · Should you have a prostate cancer test (PSA)?  It is up to you to decide if you want a prostate cancer test. Talk to your clinician to find out if the test is right for you.  Things for you to consider and talk about should include:  · Benefits and harms of the test  · Your family history  · How your race/ethnicity may influence the test  · If the test may impact other medical conditions you have  · Your values on screenings and treatments    *Medicare pays for many preventive services to keep you healthy. For some of these services, you might have to pay a deductible, coinsurance, and / or copayment.  The amounts vary depending on the type of services you need and the kind of Medicare health plan you have.               n/a

## 2020-12-30 ENCOUNTER — APPOINTMENT (OUTPATIENT)
Dept: ORTHOPEDIC SURGERY | Facility: CLINIC | Age: 70
End: 2020-12-30

## 2021-01-01 NOTE — H&P PST ADULT - ALLERGIC/IMMUNOLOGIC
I have obtained patient's history, performed physical exam and formulated management plan.   Leonard Priest
negative

## 2021-05-21 NOTE — ED ADULT NURSE NOTE - CHIEF COMPLAINT QUOTE
well developed, well nourished , in no acute distress , ambulating without difficulty , normal communication ability
I was told I had a stroke by my doctor, right arm weakness and change in speech since last night

## 2021-05-23 NOTE — CONSULT NOTE ADULT - CONSULT REQUESTED DATE/TIME
06-Nov-2919 09:00
06-Nov-2019 10:32
Anemia    Prostate cancer    SVT (supraventricular tachycardia)

## 2021-06-14 NOTE — ED PROVIDER NOTE - PSH
H/O prostate cancer  2012 - seed implant  H/O vitrectomy  right 2015 for retinal detachment  History of appendectomy    No significant past surgical history    S/P LASIK surgery  1999 2
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
## 2022-05-21 NOTE — ASU PATIENT PROFILE, ADULT - IS PATIENT PREGNANT?

## 2022-09-02 NOTE — DISCHARGE NOTE NURSING/CASE MANAGEMENT/SOCIAL WORK - NSDCPEELIQUISDIET_GEN_ALL_CORE
Eat healthy foods you enjoy. Apixaban/Eliquis DOES NOT have a special diet. Limit your alcohol intake.
None needed

## 2024-05-10 RX ORDER — DICLOFENAC SODIUM 75 MG/1
1 TABLET, DELAYED RELEASE ORAL
Qty: 0 | Refills: 0 | DISCHARGE

## 2024-05-10 RX ORDER — TAMSULOSIN HYDROCHLORIDE 0.4 MG/1
1 CAPSULE ORAL
Qty: 0 | Refills: 0 | DISCHARGE

## 2024-05-10 RX ORDER — PITAVASTATIN CALCIUM 1.04 MG/1
1 TABLET, FILM COATED ORAL
Qty: 0 | Refills: 0 | DISCHARGE

## 2024-05-10 RX ORDER — APIXABAN 2.5 MG/1
1 TABLET, FILM COATED ORAL
Qty: 0 | Refills: 0 | DISCHARGE

## 2024-05-10 RX ORDER — DICLOFENAC SODIUM 30 MG/G
0 GEL TOPICAL
Qty: 0 | Refills: 0 | DISCHARGE

## 2024-05-10 RX ORDER — SILODOSIN 4 MG/1
1 CAPSULE ORAL
Qty: 0 | Refills: 0 | DISCHARGE

## 2024-05-10 RX ORDER — PANTOPRAZOLE SODIUM 20 MG/1
1 TABLET, DELAYED RELEASE ORAL
Qty: 0 | Refills: 0 | DISCHARGE

## 2024-05-10 RX ORDER — FOLIC ACID 0.8 MG
1 TABLET ORAL
Qty: 0 | Refills: 0 | DISCHARGE

## 2024-05-10 RX ORDER — ASPIRIN/CALCIUM CARB/MAGNESIUM 324 MG
1 TABLET ORAL
Qty: 0 | Refills: 0 | DISCHARGE

## 2024-09-10 NOTE — ED ADULT NURSE NOTE - NSIMPLEMENTINTERV_GEN_ALL_ED
[Time Spent: ___ minutes] : I have spent [unfilled] minutes of time on the encounter which excludes teaching and separately reported services. Implemented All Universal Safety Interventions:  [place] to call system. Call bell, personal items and telephone within reach. Instruct patient to call for assistance. Room bathroom lighting operational. Non-slip footwear when patient is off stretcher. Physically safe environment: no spills, clutter or unnecessary equipment. Stretcher in lowest position, wheels locked, appropriate side rails in place.

## 2025-02-17 NOTE — PATIENT PROFILE ADULT - DOES PATIENT HAVE ADVANCE DIRECTIVE
A script sent for a 90 day supply with 4 refills to this pharmacy back in 1/2025, pt should not need a refill at this time   no

## 2025-03-25 NOTE — ED ADULT TRIAGE NOTE - AS HEIGHT TYPE
Left mess for Danita Charles that non-fasting lab is due to recheck blood counts, orders at [facility], and try to get done in the next 2 weeks. Updated remind me. Orders pended.   stated